# Patient Record
Sex: MALE | Race: WHITE | URBAN - METROPOLITAN AREA
[De-identification: names, ages, dates, MRNs, and addresses within clinical notes are randomized per-mention and may not be internally consistent; named-entity substitution may affect disease eponyms.]

---

## 2017-07-16 ENCOUNTER — INPATIENT (INPATIENT)
Facility: HOSPITAL | Age: 72
LOS: 0 days | Discharge: LEFT BEFORE DISCHARGE | DRG: 557 | End: 2017-07-17
Attending: INTERNAL MEDICINE | Admitting: INTERNAL MEDICINE
Payer: MEDICARE

## 2017-07-16 VITALS
DIASTOLIC BLOOD PRESSURE: 77 MMHG | SYSTOLIC BLOOD PRESSURE: 145 MMHG | WEIGHT: 119.93 LBS | TEMPERATURE: 99 F | OXYGEN SATURATION: 96 % | RESPIRATION RATE: 16 BRPM | HEART RATE: 68 BPM

## 2017-07-16 DIAGNOSIS — N18.6 END STAGE RENAL DISEASE: ICD-10-CM

## 2017-07-16 DIAGNOSIS — I10 ESSENTIAL (PRIMARY) HYPERTENSION: ICD-10-CM

## 2017-07-16 DIAGNOSIS — D64.9 ANEMIA, UNSPECIFIED: ICD-10-CM

## 2017-07-16 DIAGNOSIS — L03.90 CELLULITIS, UNSPECIFIED: ICD-10-CM

## 2017-07-16 DIAGNOSIS — K94.13 ENTEROSTOMY MALFUNCTION: ICD-10-CM

## 2017-07-16 DIAGNOSIS — N19 UNSPECIFIED KIDNEY FAILURE: ICD-10-CM

## 2017-07-16 DIAGNOSIS — M70.21 OLECRANON BURSITIS, RIGHT ELBOW: ICD-10-CM

## 2017-07-16 DIAGNOSIS — I25.10 ATHEROSCLEROTIC HEART DISEASE OF NATIVE CORONARY ARTERY WITHOUT ANGINA PECTORIS: ICD-10-CM

## 2017-07-16 LAB
ALBUMIN SERPL ELPH-MCNC: 3.1 G/DL — LOW (ref 3.3–5)
ALP SERPL-CCNC: 82 U/L — SIGNIFICANT CHANGE UP (ref 40–120)
ALT FLD-CCNC: 16 U/L — SIGNIFICANT CHANGE UP (ref 12–78)
AMYLASE P1 CFR SERPL: 99 U/L — SIGNIFICANT CHANGE UP (ref 25–115)
ANION GAP SERPL CALC-SCNC: 17 MMOL/L — SIGNIFICANT CHANGE UP (ref 5–17)
AST SERPL-CCNC: 15 U/L — SIGNIFICANT CHANGE UP (ref 15–37)
BASOPHILS # BLD AUTO: 0.1 K/UL — SIGNIFICANT CHANGE UP (ref 0–0.2)
BASOPHILS NFR BLD AUTO: 1.4 % — SIGNIFICANT CHANGE UP (ref 0–2)
BILIRUB SERPL-MCNC: 0.5 MG/DL — SIGNIFICANT CHANGE UP (ref 0.2–1.2)
BUN SERPL-MCNC: 85 MG/DL — HIGH (ref 7–23)
CALCIUM SERPL-MCNC: 7.7 MG/DL — LOW (ref 8.5–10.1)
CHLORIDE SERPL-SCNC: 113 MMOL/L — HIGH (ref 96–108)
CO2 SERPL-SCNC: 13 MMOL/L — LOW (ref 22–31)
CREAT SERPL-MCNC: 9.4 MG/DL — HIGH (ref 0.5–1.3)
CRP SERPL-MCNC: 0.5 MG/DL — HIGH (ref 0–0.4)
EOSINOPHIL # BLD AUTO: 1.4 K/UL — HIGH (ref 0–0.5)
EOSINOPHIL NFR BLD AUTO: 14.9 % — HIGH (ref 0–6)
ERYTHROCYTE [SEDIMENTATION RATE] IN BLOOD: 29 MM/HR — HIGH (ref 0–20)
GLUCOSE SERPL-MCNC: 80 MG/DL — SIGNIFICANT CHANGE UP (ref 70–99)
HCT VFR BLD CALC: 33.2 % — LOW (ref 39–50)
HGB BLD-MCNC: 10.8 G/DL — LOW (ref 13–17)
LIDOCAIN IGE QN: 356 U/L — SIGNIFICANT CHANGE UP (ref 73–393)
LYMPHOCYTES # BLD AUTO: 1 K/UL — SIGNIFICANT CHANGE UP (ref 1–3.3)
LYMPHOCYTES # BLD AUTO: 10.2 % — LOW (ref 13–44)
MCHC RBC-ENTMCNC: 32.6 GM/DL — SIGNIFICANT CHANGE UP (ref 32–36)
MCHC RBC-ENTMCNC: 33.4 PG — SIGNIFICANT CHANGE UP (ref 27–34)
MCV RBC AUTO: 102.4 FL — HIGH (ref 80–100)
MONOCYTES # BLD AUTO: 0.8 K/UL — SIGNIFICANT CHANGE UP (ref 0–0.9)
MONOCYTES NFR BLD AUTO: 8.5 % — SIGNIFICANT CHANGE UP (ref 1–9)
NEUTROPHILS # BLD AUTO: 6.3 K/UL — SIGNIFICANT CHANGE UP (ref 1.8–7.4)
NEUTROPHILS NFR BLD AUTO: 65 % — SIGNIFICANT CHANGE UP (ref 43–77)
NT-PROBNP SERPL-SCNC: 5419 PG/ML — HIGH (ref 0–125)
PLATELET # BLD AUTO: 207 K/UL — SIGNIFICANT CHANGE UP (ref 150–400)
POTASSIUM SERPL-MCNC: 4.5 MMOL/L — SIGNIFICANT CHANGE UP (ref 3.5–5.3)
POTASSIUM SERPL-SCNC: 4.5 MMOL/L — SIGNIFICANT CHANGE UP (ref 3.5–5.3)
PROT SERPL-MCNC: 6.9 G/DL — SIGNIFICANT CHANGE UP (ref 6–8.3)
RBC # BLD: 3.25 M/UL — LOW (ref 4.2–5.8)
RBC # FLD: 15.2 % — HIGH (ref 10.3–14.5)
SODIUM SERPL-SCNC: 143 MMOL/L — SIGNIFICANT CHANGE UP (ref 135–145)
WBC # BLD: 9.6 K/UL — SIGNIFICANT CHANGE UP (ref 3.8–10.5)
WBC # FLD AUTO: 9.6 K/UL — SIGNIFICANT CHANGE UP (ref 3.8–10.5)

## 2017-07-16 PROCEDURE — 93010 ELECTROCARDIOGRAM REPORT: CPT

## 2017-07-16 PROCEDURE — 71010: CPT | Mod: 26

## 2017-07-16 PROCEDURE — 99285 EMERGENCY DEPT VISIT HI MDM: CPT

## 2017-07-16 PROCEDURE — 73070 X-RAY EXAM OF ELBOW: CPT | Mod: 26,RT

## 2017-07-16 RX ORDER — PIPERACILLIN AND TAZOBACTAM 4; .5 G/20ML; G/20ML
3.38 INJECTION, POWDER, LYOPHILIZED, FOR SOLUTION INTRAVENOUS EVERY 12 HOURS
Qty: 0 | Refills: 0 | Status: DISCONTINUED | OUTPATIENT
Start: 2017-07-16 | End: 2017-07-17

## 2017-07-16 RX ORDER — ACETAMINOPHEN 500 MG
650 TABLET ORAL EVERY 6 HOURS
Qty: 0 | Refills: 0 | Status: DISCONTINUED | OUTPATIENT
Start: 2017-07-16 | End: 2017-07-17

## 2017-07-16 RX ORDER — TAMSULOSIN HYDROCHLORIDE 0.4 MG/1
0.4 CAPSULE ORAL AT BEDTIME
Qty: 0 | Refills: 0 | Status: DISCONTINUED | OUTPATIENT
Start: 2017-07-16 | End: 2017-07-17

## 2017-07-16 RX ORDER — HYDRALAZINE HCL 50 MG
50 TABLET ORAL THREE TIMES A DAY
Qty: 0 | Refills: 0 | Status: DISCONTINUED | OUTPATIENT
Start: 2017-07-16 | End: 2017-07-17

## 2017-07-16 RX ORDER — METOPROLOL TARTRATE 50 MG
12.5 TABLET ORAL DAILY
Qty: 0 | Refills: 0 | Status: DISCONTINUED | OUTPATIENT
Start: 2017-07-16 | End: 2017-07-17

## 2017-07-16 RX ORDER — ASPIRIN/CALCIUM CARB/MAGNESIUM 324 MG
81 TABLET ORAL DAILY
Qty: 0 | Refills: 0 | Status: DISCONTINUED | OUTPATIENT
Start: 2017-07-16 | End: 2017-07-17

## 2017-07-16 RX ORDER — PIPERACILLIN AND TAZOBACTAM 4; .5 G/20ML; G/20ML
3.38 INJECTION, POWDER, LYOPHILIZED, FOR SOLUTION INTRAVENOUS ONCE
Qty: 0 | Refills: 0 | Status: COMPLETED | OUTPATIENT
Start: 2017-07-16 | End: 2017-07-16

## 2017-07-16 RX ORDER — SODIUM CHLORIDE 9 MG/ML
3 INJECTION INTRAMUSCULAR; INTRAVENOUS; SUBCUTANEOUS ONCE
Qty: 0 | Refills: 0 | Status: COMPLETED | OUTPATIENT
Start: 2017-07-16 | End: 2017-07-16

## 2017-07-16 RX ORDER — HEPARIN SODIUM 5000 [USP'U]/ML
5000 INJECTION INTRAVENOUS; SUBCUTANEOUS EVERY 12 HOURS
Qty: 0 | Refills: 0 | Status: DISCONTINUED | OUTPATIENT
Start: 2017-07-16 | End: 2017-07-17

## 2017-07-16 RX ORDER — ERGOCALCIFEROL 1.25 MG/1
50000 CAPSULE ORAL
Qty: 0 | Refills: 0 | Status: DISCONTINUED | OUTPATIENT
Start: 2017-07-16 | End: 2017-07-17

## 2017-07-16 RX ORDER — SODIUM BICARBONATE 1 MEQ/ML
650 SYRINGE (ML) INTRAVENOUS
Qty: 0 | Refills: 0 | Status: DISCONTINUED | OUTPATIENT
Start: 2017-07-16 | End: 2017-07-17

## 2017-07-16 RX ADMIN — PIPERACILLIN AND TAZOBACTAM 200 GRAM(S): 4; .5 INJECTION, POWDER, LYOPHILIZED, FOR SOLUTION INTRAVENOUS at 16:00

## 2017-07-16 RX ADMIN — Medication 12.5 MILLIGRAM(S): at 17:38

## 2017-07-16 RX ADMIN — Medication 50 MILLIGRAM(S): at 17:39

## 2017-07-16 RX ADMIN — SODIUM CHLORIDE 3 MILLILITER(S): 9 INJECTION INTRAMUSCULAR; INTRAVENOUS; SUBCUTANEOUS at 11:29

## 2017-07-16 RX ADMIN — TAMSULOSIN HYDROCHLORIDE 0.4 MILLIGRAM(S): 0.4 CAPSULE ORAL at 21:55

## 2017-07-16 RX ADMIN — HEPARIN SODIUM 5000 UNIT(S): 5000 INJECTION INTRAVENOUS; SUBCUTANEOUS at 17:37

## 2017-07-16 RX ADMIN — PIPERACILLIN AND TAZOBACTAM 25 GRAM(S): 4; .5 INJECTION, POWDER, LYOPHILIZED, FOR SOLUTION INTRAVENOUS at 17:37

## 2017-07-16 RX ADMIN — Medication 50 MILLIGRAM(S): at 21:55

## 2017-07-16 NOTE — ED PROVIDER NOTE - OBJECTIVE STATEMENT
Pt is a 72 yo male reporting right elbow pain.  Pt reports that his right elbow has been swollen for the last 3 weeks.  Denies trauma to the elbow denies numbness in his ext.  He saw his PMD for this who initially diagnosed him with bursitis.  Pt reports that he has now developed overlying erythema with mild pain.  Denies previous episodes of this.  Pt also reports that he has a history of ulcerative colitis and has an ileostomy secondary to this.  He also suffers from ESRD and receives dialysis Tuesday and Saturday.  He missed his last dialysis session because his ileostomy site was leaking and he did not feel like leaving his house.  He notified his nephrologist today who told him to come to the ED for dialysis.  Denies fever, chills, N/V, CP, abd pain. Does report some SOB with non-productive cough  Pt is right hand dominant

## 2017-07-16 NOTE — CONSULT NOTE ADULT - ASSESSMENT
72 yo male presents to ER with complains of R elbow pain and swelling x 2 weeks ,denies trauma ,injury,xray demonstrated  bursitis  .Patient  missed his HD session yesterday

## 2017-07-16 NOTE — H&P ADULT - PROBLEM SELECTOR PLAN 3
continue current managmnet and medications ,Seen by Dr. Odonnell ( recommneded  asa ,plavix inititation ( patient was not on antipaltelets before likely due to UC ,but he dosent remember hx of gib )

## 2017-07-16 NOTE — ED ADULT NURSE NOTE - OBJECTIVE STATEMENT
Pt sent to ED to be admitted for dialysis. Pt reports he missed his dialysis appt. yesterday because his ileostomy was leaking, last dialyzed on 7/18, regular dialysis days are Tuesday & Saturday. Pt is C/O right elbow swelling, denies additional complaints

## 2017-07-16 NOTE — CONSULT NOTE ADULT - PROBLEM SELECTOR RECOMMENDATION 3
ANEMIA PLAN:  Anemia of chronic disease:  Well controlled by epo  H and H subtherapeutic .  We will continue epo  aiming for a HCT of 32-36 %.   We will monitor Iron stores, B12 and RBC folate .

## 2017-07-16 NOTE — CONSULT NOTE ADULT - SUBJECTIVE AND OBJECTIVE BOX
Patient is a 71y Male whom presented to the hospital with     PAST MEDICAL & SURGICAL HISTORY:  Anemia  Hypertension  Renal failure  No significant past surgical history      MEDICATIONS  (STANDING):  heparin  Injectable 5000 Unit(s) SubCutaneous every 12 hours  piperacillin/tazobactam IVPB. 3.375 Gram(s) IV Intermittent every 12 hours  aspirin enteric coated 81 milliGRAM(s) Oral daily  sodium bicarbonate 650 milliGRAM(s) Oral two times a day  tamsulosin 0.4 milliGRAM(s) Oral at bedtime  metoprolol 12.5 milliGRAM(s) Oral daily  artificial  tears Solution 1 Drop(s) Both EYES daily  hydrALAZINE 50 milliGRAM(s) Oral three times a day  calcium carbonate 1250 mG + Vitamin D (OsCal 500 + D) 1 Tablet(s) Oral every week  ergocalciferol 96044 Unit(s) Oral <User Schedule>      Allergies    Codeine Phosphate (Nausea)  morphine (Nausea)  prednisoLONE (Nausea)    Intolerances        SOCIAL HISTORY:  Denies ETOh,Smoking,     FAMILY HISTORY:  No pertinent family history in first degree relatives      REVIEW OF SYSTEMS:    CONSTITUTIONAL: No weakness, fevers or chills  EYES/ENT: No visual changes;  No vertigo or throat pain   NECK: No pain or stiffness  RESPIRATORY: No cough, wheezing, hemoptysis; No shortness of breath  CARDIOVASCULAR: No chest pain or palpitations  GASTROINTESTINAL: No abdominal or epigastric pain. No nausea, vomiting, or hematemesis; No diarrhea or constipation. No melena or hematochezia.  GENITOURINARY: No dysuria, frequency or hematuria  NEUROLOGICAL: No numbness or weakness  SKIN: No itching, burning, rashes, or lesions   All other review of systems is negative unless indicated above.  RUE -R elbow swelling and erythema   VITAL:  T(C): , Max: 37 (07-16-17 @ 10:20)  T(F): , Max: 98.6 (07-16-17 @ 10:20)  HR: 66 (07-16-17 @ 16:00)  BP: 131/61 (07-16-17 @ 16:00)  BP(mean): --  RR: 16 (07-16-17 @ 16:00)  SpO2: 98% (07-16-17 @ 16:00)  Wt(kg): --    I and O's:      Weight (kg): 54.4 (07-16 @ 10:20)    PHYSICAL EXAM:    Constitutional: NAD  Neck: No LAD, No JVD  Respiratory: CTAB  Cardiovascular: S1 and S2  Gastrointestinal: BS+, soft, NT/ND  Extremities: No peripheral edema  Neurological: A/O x 3, no focal deficits  Psychiatric: Normal mood, normal affect  : No Bell  Skin: No rashes  RUE -R elbow swelling and erythema   LABS:                        10.8   9.6   )-----------( 207      ( 16 Jul 2017 11:21 )             33.2     07-16    143  |  113<H>  |  85<H>  ----------------------------<  80  4.5   |  13<L>  |  9.40<H>    Ca    7.7<L>      16 Jul 2017 11:21    TPro  6.9  /  Alb  3.1<L>  /  TBili  0.5  /  DBili  x   /  AST  15  /  ALT  16  /  AlkPhos  82  07-16      Urine Studies:          RADIOLOGY & ADDITIONAL STUDIES:

## 2017-07-16 NOTE — CONSULT NOTE ADULT - PROBLEM SELECTOR RECOMMENDATION 9
case d/w rn  will need ostomy bag applied to ostomy  diet as tolerated  will follow
Excess fluids and waste products will be removed from your blood; your electrolytes will be balanced; your blood pressure will be controlled.
ON ZOSYN

## 2017-07-16 NOTE — H&P ADULT - REASON FOR ADMISSION
R elbow pain and swelling .Also patient missed his HD yesterday and requests to see his nephrologist

## 2017-07-16 NOTE — CONSULT NOTE ADULT - SUBJECTIVE AND OBJECTIVE BOX
CARDIOLOGY CONSULT NOTE    Patient is a 71y Male with a known history of :    HPI:      REVIEW OF SYSTEMS:    CONSTITUTIONAL: No fever, weight loss, or fatigue  EYES: No eye pain, visual disturbances, or discharge  ENMT:  No difficulty hearing, tinnitus, vertigo; No sinus or throat pain  NECK: No pain or stiffness  BREASTS: No pain, masses, or nipple discharge  RESPIRATORY: No cough, wheezing, chills or hemoptysis; No shortness of breath  CARDIOVASCULAR: No chest pain, palpitations, dizziness, or leg swelling  GASTROINTESTINAL: No abdominal or epigastric pain. No nausea, vomiting, or hematemesis; No diarrhea or constipation. No melena or hematochezia.  GENITOURINARY: No dysuria, frequency, hematuria, or incontinence  NEUROLOGICAL: No headaches, memory loss, loss of strength, numbness, or tremors  SKIN: No itching, burning, rashes, or lesions   LYMPH NODES: No enlarged glands  ENDOCRINE: No heat or cold intolerance; No hair loss  MUSCULOSKELETAL: No joint pain or swelling; No muscle, back, or extremity pain  PSYCHIATRIC: No depression, anxiety, mood swings, or difficulty sleeping  HEME/LYMPH: No easy bruising, or bleeding gums  ALLERGY AND IMMUNOLOGIC: No hives or eczema    MEDICATIONS  (STANDING):  heparin  Injectable 5000 Unit(s) SubCutaneous every 12 hours  piperacillin/tazobactam IVPB. 3.375 Gram(s) IV Intermittent once  piperacillin/tazobactam IVPB. 3.375 Gram(s) IV Intermittent every 12 hours  aspirin enteric coated 81 milliGRAM(s) Oral daily  sodium bicarbonate 650 milliGRAM(s) Oral two times a day  tamsulosin 0.4 milliGRAM(s) Oral at bedtime  metoprolol 12.5 milliGRAM(s) Oral daily  artificial  tears Solution 1 Drop(s) Both EYES daily  hydrALAZINE 50 milliGRAM(s) Oral three times a day  calcium carbonate 1250 mG + Vitamin D (OsCal 500 + D) 1 Tablet(s) Oral every week  ergocalciferol 88374 Unit(s) Oral every week    MEDICATIONS  (PRN):      ALLERGIES: Codeine Phosphate (Nausea)  morphine (Nausea)  prednisoLONE (Nausea)      Social History:     FAMILY HISTORY:  No pertinent family history in first degree relatives      PAST MEDICAL & SURGICAL HISTORY:  Anemia  Hypertension  Renal failure  No significant past surgical history        PHYSICAL EXAMINATION:  -----------------------------  T(C): 37 (07-16-17 @ 10:20), Max: 37 (07-16-17 @ 10:20)  HR: 68 (07-16-17 @ 10:20) (68 - 68)  BP: 145/77 (07-16-17 @ 10:20) (145/77 - 145/77)  RR: 16 (07-16-17 @ 10:20) (16 - 16)  SpO2: 96% (07-16-17 @ 10:20) (96% - 96%)  Wt(kg): --      Weight (kg): 54.4 (07-16 @ 10:20)    Constitutional: well developed, normal appearance, well groomed, well nourished, no deformities and no acute distress.   Eyes: the conjunctiva exhibited no abnormalities and the eyelids demonstrated no xanthelasmas.   HEENT: normal oral mucosa, no oral pallor and no oral cyanosis.   Neck: normal jugular venous A waves present, normal jugular venous V waves present and no jugular venous herron A waves.   Pulmonary: no respiratory distress, normal respiratory rhythm and effort, no accessory muscle use and lungs were clear to auscultation bilaterally.   Cardiovascular: heart rate and rhythm were normal, normal S1 and S2 and no murmur, gallop, rub, heave or thrill are present.   Abdomen: soft, non-tender, no hepato-splenomegaly and no abdominal mass palpated.   Musculoskeletal: the gait could not be assessed..   Extremities: no clubbing of the fingernails, no localized cyanosis, no petechial hemorrhages and no ischemic changes.   Skin: normal skin color and pigmentation, no rash, no venous stasis, no skin lesions, no skin ulcer and no xanthoma was observed.   Psychiatric: oriented to person, place, and time, the affect was normal, the mood was normal and not feeling anxious.     LABS:   --------                             10.8   9.6   )-----------( 207      ( 16 Jul 2017 11:21 )             33.2                 RADIOLOGY:  -----------------        ECG:     ECHO

## 2017-07-16 NOTE — H&P ADULT - HISTORY OF PRESENT ILLNESS
70 yo male presents to ER with complains of R elbow pain and swelling x 2 weeks ,denies trauma ,injury,xray demonstrated olecaranon bursitis  .Patient  missed his HD session yesterday and requests to see his nephrologist He denies cp,sob ,but admits BALLARD ,also complains of unstaedy gait and progressive weakness Patient reports leaking ileostomy bag

## 2017-07-16 NOTE — H&P ADULT - ASSESSMENT
72 yo male presents to ER with complains of R elbow pain and swelling x 2 weeks ,denies trauma ,injury,xray demonstrated olecaranon bursitis  .Patient  missed his HD session yesterday and requests to see his nephrologist He denies cp,sob ,but admits BALLARD ,also complains of unstaedy gait and progressive weakness

## 2017-07-16 NOTE — CONSULT NOTE ADULT - SUBJECTIVE AND OBJECTIVE BOX
Chief Complaint:  Patient is a 71y old  Male who presents with a chief complaint of     HPI: 71M who presents with leaking ileostomy  patient has a bella ostomy done 40 years ago for UC  states has always been leaking    Allergies:  Codeine Phosphate (Nausea)  morphine (Nausea)  prednisoLONE (Nausea)      Medications:  heparin  Injectable 5000 Unit(s) SubCutaneous every 12 hours  piperacillin/tazobactam IVPB. 3.375 Gram(s) IV Intermittent once  piperacillin/tazobactam IVPB. 3.375 Gram(s) IV Intermittent every 12 hours  aspirin enteric coated 81 milliGRAM(s) Oral daily  sodium bicarbonate 650 milliGRAM(s) Oral two times a day  tamsulosin 0.4 milliGRAM(s) Oral at bedtime  metoprolol 12.5 milliGRAM(s) Oral daily  artificial  tears Solution 1 Drop(s) Both EYES daily  hydrALAZINE 50 milliGRAM(s) Oral three times a day  calcium carbonate 1250 mG + Vitamin D (OsCal 500 + D) 1 Tablet(s) Oral every week  ergocalciferol 76390 Unit(s) Oral <User Schedule>  acetaminophen   Tablet 650 milliGRAM(s) Oral every 6 hours PRN  acetaminophen   Tablet. 650 milliGRAM(s) Oral every 6 hours PRN      PMHX/PSHX:  Anemia  Hypertension  Renal failure  No significant past surgical history      Family history:  No pertinent family history in first degree relatives      Social History:     ROS:     General:  No wt loss, fevers, chills, night sweats, fatigue,   Eyes:  Good vision, no reported pain  ENT:  No sore throat, pain, runny nose, dysphagia  CV:  No pain, palpitations, hypo/hypertension  Resp:  No dyspnea, cough, tachypnea, wheezing  GI:  No pain, No nausea, No vomiting, No diarrhea, No constipation, No weight loss, No fever, No pruritis, No rectal bleeding, No tarry stools, No dysphagia,  :  No pain, bleeding, incontinence, nocturia  Muscle:  No pain, weakness  Neuro:  No weakness, tingling, memory problems  Psych:  No fatigue, insomnia, mood problems, depression  Endocrine:  No polyuria, polydipsia, cold/heat intolerance  Heme:  No petechiae, ecchymosis, easy bruisability  Skin:  No rash, tattoos, scars, edema      PHYSICAL EXAM:   Vital Signs:  Vital Signs Last 24 Hrs  T(C): 37 (16 Jul 2017 10:20), Max: 37 (16 Jul 2017 10:20)  T(F): 98.6 (16 Jul 2017 10:20), Max: 98.6 (16 Jul 2017 10:20)  HR: 68 (16 Jul 2017 10:20) (68 - 68)  BP: 145/77 (16 Jul 2017 10:20) (145/77 - 145/77)  BP(mean): --  RR: 16 (16 Jul 2017 10:20) (16 - 16)  SpO2: 96% (16 Jul 2017 10:20) (96% - 96%)  Daily     Daily     GENERAL:  Appears stated age, well-groomed, well-nourished, no distress  HEENT:  NC/AT,  conjunctivae clear and pink, no thyromegaly, nodules, adenopathy, no JVD, sclera -anicteric  CHEST:  Full & symmetric excursion, no increased effort, breath sounds clear  HEART:  Regular rhythm, S1, S2, no murmur/rub/S3/S4, no abdominal bruit, no edema  ABDOMEN:  Soft, non-tender, non-distended, normoactive bowel sounds,  no masses ,no hepato-splenomegaly, no signs of chronic liver disease  EXTEREMITIES:  no cyanosis,clubbing or edema  SKIN:  No rash/erythema/ecchymoses/petechiae/wounds/abscess/warm/dry  NEURO:  Alert, oriented, no asterixis, no tremor, no encephalopathy    LABS:                        10.8   9.6   )-----------( 207      ( 16 Jul 2017 11:21 )             33.2     07-16    143  |  113<H>  |  85<H>  ----------------------------<  80  4.5   |  13<L>  |  9.40<H>    Ca    7.7<L>      16 Jul 2017 11:21    TPro  6.9  /  Alb  3.1<L>  /  TBili  0.5  /  DBili  x   /  AST  15  /  ALT  16  /  AlkPhos  82  07-16    LIVER FUNCTIONS - ( 16 Jul 2017 11:21 )  Alb: 3.1 g/dL / Pro: 6.9 g/dL / ALK PHOS: 82 U/L / ALT: 16 U/L / AST: 15 U/L / GGT: x               Amylase Serum99      Lipase [id number]       Ammonia--      Imaging:

## 2017-07-16 NOTE — ED PROVIDER NOTE - MUSCULOSKELETAL, MLM
superficial joint effusion/ bursitis noted to right elbow, FROM sensation intact overlying erythema with increased warm +radial pulse no streaking lymphangiitis seen

## 2017-07-16 NOTE — CONSULT NOTE ADULT - PROBLEM SELECTOR RECOMMENDATION 4
,obtain ECHO to evaluate LVEF,cardiology consult,continue current managmnet,O2 supply,anticoagulation plan as per cardiology consult

## 2017-07-17 VITALS — WEIGHT: 133.82 LBS

## 2017-07-17 LAB
ANION GAP SERPL CALC-SCNC: 14 MMOL/L — SIGNIFICANT CHANGE UP (ref 5–17)
BUN SERPL-MCNC: 89 MG/DL — HIGH (ref 7–23)
CALCIUM SERPL-MCNC: 7.4 MG/DL — LOW (ref 8.5–10.1)
CHLORIDE SERPL-SCNC: 112 MMOL/L — HIGH (ref 96–108)
CO2 SERPL-SCNC: 16 MMOL/L — LOW (ref 22–31)
CREAT SERPL-MCNC: 9.3 MG/DL — HIGH (ref 0.5–1.3)
GLUCOSE SERPL-MCNC: 107 MG/DL — HIGH (ref 70–99)
HCT VFR BLD CALC: 29.5 % — LOW (ref 39–50)
HGB BLD-MCNC: 9.8 G/DL — LOW (ref 13–17)
INR BLD: 0.94 RATIO — SIGNIFICANT CHANGE UP (ref 0.88–1.16)
MCHC RBC-ENTMCNC: 33.3 GM/DL — SIGNIFICANT CHANGE UP (ref 32–36)
MCHC RBC-ENTMCNC: 33.7 PG — SIGNIFICANT CHANGE UP (ref 27–34)
MCV RBC AUTO: 101.4 FL — HIGH (ref 80–100)
PLATELET # BLD AUTO: 185 K/UL — SIGNIFICANT CHANGE UP (ref 150–400)
POTASSIUM SERPL-MCNC: 3.7 MMOL/L — SIGNIFICANT CHANGE UP (ref 3.5–5.3)
POTASSIUM SERPL-SCNC: 3.7 MMOL/L — SIGNIFICANT CHANGE UP (ref 3.5–5.3)
PREALB SERPL-MCNC: 27.1 MG/DL — SIGNIFICANT CHANGE UP (ref 20–40)
PROTHROM AB SERPL-ACNC: 10.2 SEC — SIGNIFICANT CHANGE UP (ref 9.8–12.7)
RBC # BLD: 2.91 M/UL — LOW (ref 4.2–5.8)
RBC # FLD: 15.4 % — HIGH (ref 10.3–14.5)
SODIUM SERPL-SCNC: 142 MMOL/L — SIGNIFICANT CHANGE UP (ref 135–145)
WBC # BLD: 8.7 K/UL — SIGNIFICANT CHANGE UP (ref 3.8–10.5)
WBC # FLD AUTO: 8.7 K/UL — SIGNIFICANT CHANGE UP (ref 3.8–10.5)

## 2017-07-17 PROCEDURE — 83690 ASSAY OF LIPASE: CPT

## 2017-07-17 PROCEDURE — 80053 COMPREHEN METABOLIC PANEL: CPT

## 2017-07-17 PROCEDURE — 85027 COMPLETE CBC AUTOMATED: CPT

## 2017-07-17 PROCEDURE — 83880 ASSAY OF NATRIURETIC PEPTIDE: CPT

## 2017-07-17 PROCEDURE — 86140 C-REACTIVE PROTEIN: CPT

## 2017-07-17 PROCEDURE — 99285 EMERGENCY DEPT VISIT HI MDM: CPT | Mod: 25

## 2017-07-17 PROCEDURE — 71045 X-RAY EXAM CHEST 1 VIEW: CPT

## 2017-07-17 PROCEDURE — 97161 PT EVAL LOW COMPLEX 20 MIN: CPT

## 2017-07-17 PROCEDURE — 96365 THER/PROPH/DIAG IV INF INIT: CPT

## 2017-07-17 PROCEDURE — 80048 BASIC METABOLIC PNL TOTAL CA: CPT

## 2017-07-17 PROCEDURE — 96372 THER/PROPH/DIAG INJ SC/IM: CPT | Mod: XU

## 2017-07-17 PROCEDURE — 85610 PROTHROMBIN TIME: CPT

## 2017-07-17 PROCEDURE — 93005 ELECTROCARDIOGRAM TRACING: CPT

## 2017-07-17 PROCEDURE — 73070 X-RAY EXAM OF ELBOW: CPT

## 2017-07-17 PROCEDURE — 82150 ASSAY OF AMYLASE: CPT

## 2017-07-17 PROCEDURE — 84134 ASSAY OF PREALBUMIN: CPT

## 2017-07-17 PROCEDURE — 85652 RBC SED RATE AUTOMATED: CPT

## 2017-07-17 RX ADMIN — PIPERACILLIN AND TAZOBACTAM 25 GRAM(S): 4; .5 INJECTION, POWDER, LYOPHILIZED, FOR SOLUTION INTRAVENOUS at 05:10

## 2017-07-17 RX ADMIN — HEPARIN SODIUM 5000 UNIT(S): 5000 INJECTION INTRAVENOUS; SUBCUTANEOUS at 17:13

## 2017-07-17 RX ADMIN — Medication 81 MILLIGRAM(S): at 13:26

## 2017-07-17 RX ADMIN — Medication 50 MILLIGRAM(S): at 05:11

## 2017-07-17 RX ADMIN — Medication 50 MILLIGRAM(S): at 14:57

## 2017-07-17 RX ADMIN — Medication 650 MILLIGRAM(S): at 05:11

## 2017-07-17 RX ADMIN — Medication 650 MILLIGRAM(S): at 17:11

## 2017-07-17 RX ADMIN — Medication 12.5 MILLIGRAM(S): at 05:12

## 2017-07-17 RX ADMIN — HEPARIN SODIUM 5000 UNIT(S): 5000 INJECTION INTRAVENOUS; SUBCUTANEOUS at 05:11

## 2017-07-17 NOTE — PROGRESS NOTE ADULT - ASSESSMENT
70 yo male presents to ER with complains of R elbow pain and swelling x 2 weeks ,denies trauma ,injury,xray demonstrated olecaranon bursitis  .Patient  missed his HD session yesterday and requests to see his nephrologist He denies cp,sob ,but admits BALLARD ,also complains of unstaedy gait and progressive weakness
72 yo male presents to ER with complains of R elbow pain and swelling x 2 weeks ,denies trauma ,injury,xray demonstrated  bursitis  .Patient  missed his HD session yesterday
72 yo male with a 40 year history of ileostomy
pt with esrd - hd - cellulitis rt elbow

## 2017-07-17 NOTE — PROGRESS NOTE ADULT - PROBLEM SELECTOR PROBLEM 2
Ileostomy dysfunction
ESRD (end stage renal disease) on dialysis
ESRD (end stage renal disease) on dialysis

## 2017-07-17 NOTE — CONSULT NOTE ADULT - SUBJECTIVE AND OBJECTIVE BOX
71M admitted with possible cellulitis and right elbow olecranon bursitis.  Patient seen at bedside to evaluate elbow.  Reports elbow has been swollen in the back for the past 2-3 weeks.  Denies any recent fever/chills/recent infection, denies any trauma to elbow or any insect bites/pimples/lesions of the skin.    AVSS  Gen: NAD, AAOx3    Right Upper Extremity:  Skin intact, palpable fluid collection posterior to olecranon, slight warmth, no erythema  +AIN/PIN/M/R/U/Msc/Ax  Able to actively flex and extend elbow without pain  SILT C5-T1  +Radial Pulse  Compartments soft  No calf TTP B/L    Secondary Survey: No TTP over bony prominences, SILT, palpable pulses, full/painless range of motion, compartments soft 71M admitted with possible cellulitis and right elbow olecranon bursitis.  Patient seen at bedside to evaluate elbow.  Reports elbow has been swollen in the back for the past 2-3 weeks.  Denies any recent fever/chills/recent infection, denies any trauma to elbow or any insect bites/pimples/lesions of the skin.    AVSS  Gen: NAD, AAOx3    Right Upper Extremity:  Skin intact, palpable fluid collection posterior to olecranon, slight warmth, no erythema, no TTP at wrist/elbow  +AIN/PIN/M/R/U/Msc/Ax  Able to actively flex and extend elbow without pain through full arc of ROM, no elbow laxity  SILT C5-T1  +Radial Pulse  Compartments soft  No calf TTP B/L    Secondary Survey: No TTP over bony prominences, SILT, palpable pulses, full/painless range of motion, compartments soft

## 2017-07-17 NOTE — PROGRESS NOTE ADULT - PROBLEM SELECTOR PLAN 4
ileostomy care ,GI cons ,continue ppi
s/p coronary stent
,obtain ECHO to evaluate LVEF,cardiology consult,continue current managmnet,O2 supply,anticoagulation plan as per cardiology consult

## 2017-07-17 NOTE — CONSULT NOTE ADULT - ASSESSMENT
A/P: 71M with R olecranon bursitis  Based on clinical examination, labs, imaging and physical examination, unlikely infected elbow joint.  No antibiotics needed for R olecranon bursitis.  Analgesia  DVT ppx, encourage ambulation  WBAT RUE  PT/OT as needed  FU with Dr. Felix as outpatient in 1-2 weeks from discharge, call office for appointment  No acute orthopedic intervention needed at this time  Will discuss with attending and advise if plan changes A/P: 71M with R olecranon bursitis  Based on clinical examination, labs, imaging and physical examination, unlikely infected elbow joint.  No antibiotics needed for R olecranon bursitis.  Analgesia  DVT ppx, encourage ambulation  WBAT RUE  PT/OT as needed  Recommend warm compresses and compressive therapy daily (was wrapped in ACE wrap at bedside)  FU with Dr. Felix as outpatient in 1-2 weeks from discharge, call office for appointment  No acute orthopedic intervention needed at this time  Will discuss with attending and advise if plan changes

## 2017-07-17 NOTE — PROVIDER CONTACT NOTE (OTHER) - BACKGROUND
pt wanted to be d/c after dialysis, Dr. Cabello was called earlier and made aware on her rounds in am of his wishes

## 2017-07-17 NOTE — PROGRESS NOTE ADULT - PROBLEM SELECTOR PLAN 1
Admitted for septic workup and evaluation,send blood and urine cx,serial lactate levels,monitor vitals closley,ivfs hydration,monitor urine output and renal profile,iv abx initiated
no leakage noted with ostomy bag  ostomy care explained to patient  follow up as outpatient
on zosyn
Excess fluids and waste products will be removed from your blood; your electrolytes will be balanced; your blood pressure will be controlled.

## 2017-07-17 NOTE — PROGRESS NOTE ADULT - SUBJECTIVE AND OBJECTIVE BOX
Chief Complaint:  Patient is a 71y old  Male who presents with a chief complaint of leakage from stoma    HPI: 71M who presents with leaking ileostomy  patient has a bella ostomy done 40 years ago for UC  states has always been leaking    MEDICATIONS  (STANDING):  heparin  Injectable 5000 Unit(s) SubCutaneous every 12 hours  piperacillin/tazobactam IVPB. 3.375 Gram(s) IV Intermittent every 12 hours  aspirin enteric coated 81 milliGRAM(s) Oral daily  sodium bicarbonate 650 milliGRAM(s) Oral two times a day  tamsulosin 0.4 milliGRAM(s) Oral at bedtime  metoprolol 12.5 milliGRAM(s) Oral daily  artificial  tears Solution 1 Drop(s) Both EYES daily  hydrALAZINE 50 milliGRAM(s) Oral three times a day  calcium carbonate 1250 mG + Vitamin D (OsCal 500 + D) 1 Tablet(s) Oral every week  ergocalciferol 18170 Unit(s) Oral <User Schedule>    MEDICATIONS  (PRN):  acetaminophen   Tablet 650 milliGRAM(s) Oral every 6 hours PRN For Temp greater than 38 C (100.4 F)  acetaminophen   Tablet. 650 milliGRAM(s) Oral every 6 hours PRN Mild Pain (1 - 3)      Allergies    Codeine Phosphate (Nausea)  morphine (Nausea)  prednisoLONE (Nausea)    Intolerances          General:  No wt loss, fevers, chills, night sweats, fatigue,   Eyes:  Good vision, no reported pain  ENT:  No sore throat, pain, runny nose, dysphagia  CV:  No pain, palpitations, hypo/hypertension  Resp:  No dyspnea, cough, tachypnea, wheezing  GI:  No pain, No nausea, No vomiting, No diarrhea, No constipation, No weight loss, No fever, No pruritis, No rectal bleeding, No tarry stools, No dysphagia,  :  No pain, bleeding, incontinence, nocturia  Muscle:  No pain, weakness  Neuro:  No weakness, tingling, memory problems  Psych:  No fatigue, insomnia, mood problems, depression  Endocrine:  No polyuria, polydipsia, cold/heat intolerance  Heme:  No petechiae, ecchymosis, easy bruisability  Skin:  No rash, tattoos, scars, edema      PHYSICAL EXAM:   Vital Signs:  Vital Signs Last 24 Hrs  T(C): 36.9 (2017 13:54), Max: 37.1 (2017 04:48)  T(F): 98.5 (2017 13:54), Max: 98.7 (2017 04:48)  HR: 71 (2017 13:54) (55 - 71)  BP: 139/67 (2017 13:54) (122/62 - 143/70)  BP(mean): --  RR: 16 (2017 13:54) (16 - 18)  SpO2: 100% (2017 13:54) (98% - 977%)  Daily     Daily Weight in k.7 (2017 15:13)I&O's Summary    2017 07:01  -  2017 07:00  --------------------------------------------------------  IN: 360 mL / OUT: 0 mL / NET: 360 mL    2017 07:01  -  2017 18:28  --------------------------------------------------------  IN: 0 mL / OUT: 0 mL / NET: 0 mL        GENERAL:  Appears stated age, well-groomed, well-nourished, no distress  HEENT:  NC/AT,  conjunctivae clear and pink, no thyromegaly, nodules, adenopathy, no JVD, sclera -anicteric  CHEST:  Full & symmetric excursion, no increased effort, breath sounds clear  HEART:  Regular rhythm, S1, S2, no murmur/rub/S3/S4, no abdominal bruit, no edema  ABDOMEN:  Soft, non-tender, non-distended, normoactive bowel sounds,  no masses ,no hepato-splenomegaly, no signs of chronic liver disease, + stoma  EXTEREMITIES:  no cyanosis,clubbing or edema  SKIN:  No rash/erythema/ecchymoses/petechiae/wounds/abscess/warm/dry  NEURO:  Alert, oriented, no asterixis, no tremor, no encephalopathy      LABS:                        9.8    8.7   )-----------( 185      ( 2017 06:37 )             29.5     -    142  |  112<H>  |  89<H>  ----------------------------<  107<H>  3.7   |  16<L>  |  9.30<H>    Ca    7.4<L>      2017 06:37    TPro  6.9  /  Alb  3.1<L>  /  TBili  0.5  /  DBili  x   /  AST  15  /  ALT  16  /  AlkPhos  82  -    PT/INR - ( 2017 06:37 )   PT: 10.2 sec;   INR: 0.94 ratio             amylaseAmylase, Serum Total: 99 U/L ( @ 11:21)     lipaseLipase, Serum: 356 U/L ( @ 11:21)    RADIOLOGY & ADDITIONAL TESTS:
Patient is a 71y Male with a known history of :  Anemia (D64.9)  Olecranon bursitis of right elbow (M70.21)  ESRD (end stage renal disease) on dialysis (N18.6)  Ileostomy dysfunction (K94.13)  CAD (coronary artery disease) (I25.10)  Hypertension (I10)  Renal failure (N19)  Cellulitis (L03.90)    HPI:  72 yo male presents to ER with complains of R elbow pain and swelling x 2 weeks ,denies trauma ,injury,xray demonstrated olecaranon bursitis  .Patient  missed his HD session yesterday and requests to see his nephrologist He denies cp,sob ,but admits BALLARD ,also complains of unstaedy gait and progressive weakness Patient reports leaking ileostomy bag (16 Jul 2017 11:45)      REVIEW OF SYSTEMS:    CONSTITUTIONAL: No fever, weight loss, or fatigue  EYES: No eye pain, visual disturbances, or discharge  ENMT:  No difficulty hearing, tinnitus, vertigo; No sinus or throat pain  NECK: No pain or stiffness  BREASTS: No pain, masses, or nipple discharge  RESPIRATORY: No cough, wheezing, chills or hemoptysis; No shortness of breath  CARDIOVASCULAR: No chest pain, palpitations, dizziness, or leg swelling  GASTROINTESTINAL: No abdominal or epigastric pain. No nausea, vomiting, or hematemesis; No diarrhea or constipation. No melena or hematochezia.  GENITOURINARY: No dysuria, frequency, hematuria, or incontinence  NEUROLOGICAL: No headaches, memory loss, loss of strength, numbness, or tremors  SKIN: No itching, burning, rashes, or lesions   LYMPH NODES: No enlarged glands  ENDOCRINE: No heat or cold intolerance; No hair loss  MUSCULOSKELETAL: No joint pain or swelling; No muscle, back, or extremity pain  PSYCHIATRIC: No depression, anxiety, mood swings, or difficulty sleeping  HEME/LYMPH: No easy bruising, or bleeding gums  ALLERGY AND IMMUNOLOGIC: No hives or eczema    MEDICATIONS  (STANDING):  heparin  Injectable 5000 Unit(s) SubCutaneous every 12 hours  piperacillin/tazobactam IVPB. 3.375 Gram(s) IV Intermittent every 12 hours  aspirin enteric coated 81 milliGRAM(s) Oral daily  sodium bicarbonate 650 milliGRAM(s) Oral two times a day  tamsulosin 0.4 milliGRAM(s) Oral at bedtime  metoprolol 12.5 milliGRAM(s) Oral daily  artificial  tears Solution 1 Drop(s) Both EYES daily  hydrALAZINE 50 milliGRAM(s) Oral three times a day  calcium carbonate 1250 mG + Vitamin D (OsCal 500 + D) 1 Tablet(s) Oral every week  ergocalciferol 01854 Unit(s) Oral <User Schedule>    MEDICATIONS  (PRN):  acetaminophen   Tablet 650 milliGRAM(s) Oral every 6 hours PRN For Temp greater than 38 C (100.4 F)  acetaminophen   Tablet. 650 milliGRAM(s) Oral every 6 hours PRN Mild Pain (1 - 3)      ALLERGIES: Codeine Phosphate (Nausea)  morphine (Nausea)  prednisoLONE (Nausea)      FAMILY HISTORY:  No pertinent family history in first degree relatives      PHYSICAL EXAMINATION:  -----------------------------  T(C): 37.1 (07-17-17 @ 04:48), Max: 37.1 (07-17-17 @ 04:48)  HR: 66 (07-17-17 @ 04:48) (55 - 68)  BP: 123/62 (07-17-17 @ 04:48) (122/62 - 145/77)  RR: 16 (07-17-17 @ 04:48) (16 - 16)  SpO2: 99% (07-17-17 @ 04:48) (96% - 99%)  Wt(kg): --    07-16 @ 07:01  -  07-17 @ 07:00  --------------------------------------------------------  IN:    Oral Fluid: 360 mL  Total IN: 360 mL    OUT:  Total OUT: 0 mL    Total NET: 360 mL          Weight (kg): 51.9 (07-16 @ 16:57)    Constitutional: well developed, normal appearance, well groomed, well nourished, no deformities and no acute distress.   Eyes: the conjunctiva exhibited no abnormalities and the eyelids demonstrated no xanthelasmas.   HEENT: normal oral mucosa, no oral pallor and no oral cyanosis.   Neck: normal jugular venous A waves present, normal jugular venous V waves present and no jugular venous herron A waves.   Pulmonary: no respiratory distress, normal respiratory rhythm and effort, no accessory muscle use and lungs were clear to auscultation bilaterally.   Cardiovascular: heart rate and rhythm were normal, normal S1 and S2 and no murmur, gallop, rub, heave or thrill are present.   Abdomen: soft, non-tender, no hepato-splenomegaly and no abdominal mass palpated.   Musculoskeletal: the gait could not be assessed..   Extremities: no clubbing of the fingernails, no localized cyanosis, no petechial hemorrhages and no ischemic changes.   Skin: normal skin color and pigmentation, no rash, no venous stasis, no skin lesions, no skin ulcer and no xanthoma was observed.   Psychiatric: oriented to person, place, and time, the affect was normal, the mood was normal and not feeling anxious.     LABS:   --------  07-17    142  |  112<H>  |  89<H>  ----------------------------<  107<H>  3.7   |  16<L>  |  9.30<H>    Ca    7.4<L>      17 Jul 2017 06:37    TPro  6.9  /  Alb  3.1<L>  /  TBili  0.5  /  DBili  x   /  AST  15  /  ALT  16  /  AlkPhos  82  07-16                         9.8    8.7   )-----------( 185      ( 17 Jul 2017 06:37 )             29.5     PT/INR - ( 17 Jul 2017 06:37 )   PT: 10.2 sec;   INR: 0.94 ratio           07-16 @ 11:21 BNP: 5419 pg/mL            RADIOLOGY:  -----------------        ECG:
Patient is a 71y Male with past medical history of           presenting with        who reports no complaints overnight.    REVIEW OF SYSTEMS:    CONSTITUTIONAL: No weakness, fevers or chills  RESPIRATORY: No cough, wheezing, hemoptysis; No shortness of breath  CARDIOVASCULAR: No chest pain or palpitations  ABDOMEN: no abd pains, nausea or vomiting  GENITOURINARY: No dysuria, frequency or hematuria  All other review of systems is negative unless indicated above.    Allergies    Codeine Phosphate (Nausea)  morphine (Nausea)  prednisoLONE (Nausea)    Intolerances        MEDICATIONS  (STANDING):  heparin  Injectable 5000 Unit(s) SubCutaneous every 12 hours  piperacillin/tazobactam IVPB. 3.375 Gram(s) IV Intermittent every 12 hours  aspirin enteric coated 81 milliGRAM(s) Oral daily  sodium bicarbonate 650 milliGRAM(s) Oral two times a day  tamsulosin 0.4 milliGRAM(s) Oral at bedtime  metoprolol 12.5 milliGRAM(s) Oral daily  artificial  tears Solution 1 Drop(s) Both EYES daily  hydrALAZINE 50 milliGRAM(s) Oral three times a day  calcium carbonate 1250 mG + Vitamin D (OsCal 500 + D) 1 Tablet(s) Oral every week  ergocalciferol 67218 Unit(s) Oral <User Schedule>      Vitals:  T(F): 97.8 (07-17-17 @ 09:40), Max: 98.7 (07-17-17 @ 04:48)  HR: 63 (07-17-17 @ 09:40)  BP: 130/62 (07-17-17 @ 09:40)  BP(mean): --  RR: 17 (07-17-17 @ 09:40)  SpO2: 98% (07-17-17 @ 09:40)  Wt(kg): --    I and O's:    07-16 @ 07:01  -  07-17 @ 07:00  --------------------------------------------------------  IN: 360 mL / OUT: 0 mL / NET: 360 mL          Weight (kg): 51.9 (07-16 @ 16:57)    PHYSICAL EXAM:    Constitutional: NAD,   HEENT:  EOMI,  MMM  Neck: No JVD  Respiratory: CTAB  Cardiovascular: S1 and S2  Gastrointestinal: BS+, soft, NT/ND  Extremities: No peripheral edema  Neurological: A/O x 3, no focal deficits  Psychiatric: Normal mood, normal affect  : No Bell  Skin: No rashes  Dialysis Access: Not applicable    LABS:                        9.8    8.7   )-----------( 185      ( 17 Jul 2017 06:37 )             29.5                         10.8   9.6   )-----------( 207      ( 16 Jul 2017 11:21 )             33.2       142    |  112    |  89     ----------------------------<  107       17 Jul 2017 06:37  3.7     |  16     |  9.30     143    |  113    |  85     ----------------------------<  80        16 Jul 2017 11:21  4.5     |  13     |  9.40     Ca    7.4        17 Jul 2017 06:37  Ca    7.7        16 Jul 2017 11:21        TPro  6.9    /  Alb  3.1    /  TBili  0.5    /        16 Jul 2017 11:21  DBili  x      /  AST  15     /  ALT  16     /  AlkPhos  82           Serum Osmo:   Serum Uric Acid:   DANNI:   Double Stranded DNA:   C3:   C3 Nephritic Factor:   C4:   p-ANCA:   c-ANCA:   Anti-GBM:   JEIMY-Smith Antibody:   Immunofixation:   [place]/Lambda Free Light Chain:   SPEP:   Hepatitis Panel:   HIV-1/2:     Urine Studies:      Urine chemistry:   Urine Na:   Urine Creatinine:   Urine Protein/Cr ratio:  Urine K:   Urine Osm:   24 Hr urine studies:     24 hr urine Creatinine Clearance:    RADIOLOGY & ADDITIONAL STUDIES:
CARDIOLOGY CONSULT NOTE    Patient was seen in HD unit ,tolerates session well Complains of right elbow pain and swelling,xray demonstrated bursitis ,ortho eval is pending   denies cp,sob   Comfortable,NAD,VSS      REVIEW OF SYSTEMS:    CONSTITUTIONAL: No fever, weight loss, or fatigue  EYES: No eye pain, visual disturbances, or discharge  ENMT:  No difficulty hearing, tinnitus, vertigo; No sinus or throat pain  NECK: No pain or stiffness  BREASTS: No pain, masses, or nipple discharge  RESPIRATORY: No cough, wheezing, chills or hemoptysis; No shortness of breath  CARDIOVASCULAR: No chest pain, palpitations, dizziness, or leg swelling  GASTROINTESTINAL: No abdominal or epigastric pain. No nausea, vomiting, or hematemesis; No diarrhea or constipation. No melena or hematochezia.  GENITOURINARY: No dysuria, frequency, hematuria, or incontinence  NEUROLOGICAL: No headaches, memory loss, loss of strength, numbness, or tremors  SKIN: No itching, burning, rashes, or lesions   LYMPH NODES: No enlarged glands  ENDOCRINE: No heat or cold intolerance; No hair loss  MUSCULOSKELETAL: No joint pain or swelling; No muscle, back, or extremity pain  PSYCHIATRIC: No depression, anxiety, mood swings, or difficulty sleeping  HEME/LYMPH: No easy bruising, or bleeding gums  ALLERGY AND IMMUNOLOGIC: No hives or eczema    MEDICATIONS  (STANDING):  heparin  Injectable 5000 Unit(s) SubCutaneous every 12 hours  piperacillin/tazobactam IVPB. 3.375 Gram(s) IV Intermittent once  piperacillin/tazobactam IVPB. 3.375 Gram(s) IV Intermittent every 12 hours  aspirin enteric coated 81 milliGRAM(s) Oral daily  sodium bicarbonate 650 milliGRAM(s) Oral two times a day  tamsulosin 0.4 milliGRAM(s) Oral at bedtime  metoprolol 12.5 milliGRAM(s) Oral daily  artificial  tears Solution 1 Drop(s) Both EYES daily  hydrALAZINE 50 milliGRAM(s) Oral three times a day  calcium carbonate 1250 mG + Vitamin D (OsCal 500 + D) 1 Tablet(s) Oral every week  ergocalciferol 10773 Unit(s) Oral every week    MEDICATIONS  (PRN):      ALLERGIES: Codeine Phosphate (Nausea)  morphine (Nausea)  prednisoLONE (Nausea)      Social History:     FAMILY HISTORY:  No pertinent family history in first degree relatives      PAST MEDICAL & SURGICAL HISTORY:  Anemia  Hypertension  Renal failure  No significant past surgical history        PHYSICAL EXAMINATION:  -----------------------------  T(C): 37 (07-16-17 @ 10:20), Max: 37 (07-16-17 @ 10:20)  HR: 68 (07-16-17 @ 10:20) (68 - 68)  BP: 145/77 (07-16-17 @ 10:20) (145/77 - 145/77)  RR: 16 (07-16-17 @ 10:20) (16 - 16)  SpO2: 96% (07-16-17 @ 10:20) (96% - 96%)  Wt(kg): --      Weight (kg): 54.4 (07-16 @ 10:20)    Constitutional: underweight no deformities and no acute distress.   Eyes: the conjunctiva exhibited no abnormalities and the eyelids demonstrated no xanthelasmas.   HEENT: normal oral mucosa, no oral pallor and no oral cyanosis.   Neck: normal jugular venous A waves present, normal jugular venous V waves present and no jugular venous herron A waves.   Pulmonary: no respiratory distress, normal respiratory rhythm and effort, no accessory muscle use and lungs were clear to auscultation bilaterally.   Cardiovascular: heart rate and rhythm were normal, normal S1 and S2 and no murmur, gallop, rub, heave or thrill are present.   Abdomen: soft, non-tender, no hepato-splenomegaly and no abdominal mass palpated.   Musculoskeletal: the gait could not be assessed..   Extremities: no clubbing of the fingernails, no localized cyanosis, R ELBOW swelling and erythema ,decreased ROM due to pain  Skin: erythema of right elbow  Psychiatric: oriented to person, place, and time, the affect was normal, the mood was normal and not feeling anxious.     LABS:   --------                             10.8   9.6   )-----------( 207      ( 16 Jul 2017 11:21 )             33.2                 RADIOLOGY: Labs and imaging reviewed electronically   -----------------        ECG:     ECHO

## 2017-07-17 NOTE — PHYSICAL THERAPY INITIAL EVALUATION ADULT - PERTINENT HX OF CURRENT PROBLEM, REHAB EVAL
72 yo male presents to ER with complains of R elbow pain and swelling x 2 weeks ,denies trauma ,injury, xray demonstrated olecaranon bursitis

## 2017-07-17 NOTE — PROVIDER CONTACT NOTE (CHANGE IN STATUS NOTIFICATION) - ASSESSMENT
Patient refused assessment and refused observation to empty pouch via [place] 40 moffett catheter:

## 2017-07-17 NOTE — PROGRESS NOTE ADULT - PROBLEM SELECTOR PLAN 5
ortho cons called ,check crp ,esr ,pain management
Admit for septic workup and ID evaluation,send blood and urine cx,serial lactate levels,monitor vitals closley,ivfs hydration,monitor urine output and renal profile,iv abx as per id cons

## 2017-07-17 NOTE — PROGRESS NOTE ADULT - PROBLEM SELECTOR PROBLEM 1
Cellulitis
ESRD (end stage renal disease) on dialysis
Ileostomy dysfunction
Olecranon bursitis of right elbow

## 2017-07-17 NOTE — PHYSICAL THERAPY INITIAL EVALUATION ADULT - RANGE OF MOTION EXAMINATION, REHAB EVAL
bilateral lower extremity ROM was WFL (within functional limits)/R elbow slightly limited/bilateral upper extremity ROM was WFL (within functional limits)

## 2017-07-17 NOTE — PROGRESS NOTE ADULT - PROBLEM SELECTOR PLAN 3
continue current managmnet and medications ,Seen by Dr. Odonnell ( recommneded  asa and /or plavix inititation ( patient was not on antipaltelets before likely due to UC ,but he dosent remember hx of gib )
chronic
ANEMIA PLAN:  Anemia of chronic disease:  continue with epo    H and H subtherapeutic .  We will continue epo aiming for a HCT of 32-36 %.

## 2017-07-17 NOTE — DIETITIAN INITIAL EVALUATION ADULT. - OTHER INFO
Pt admitted with right elbow pain and swellling with cellulitis.  Tolerating diet with good po intake.  No n/v/d/c.

## 2017-07-17 NOTE — PROVIDER CONTACT NOTE (OTHER) - ACTION/TREATMENT ORDERED:
message left
Dr. Cabello called back and states she reviewed his chart and labs and is not going to d/c him today, if he wants to leave he has to sign out ama

## 2017-07-20 DIAGNOSIS — Z99.2 DEPENDENCE ON RENAL DIALYSIS: ICD-10-CM

## 2017-07-20 DIAGNOSIS — Z88.5 ALLERGY STATUS TO NARCOTIC AGENT: ICD-10-CM

## 2017-07-20 DIAGNOSIS — Z79.82 LONG TERM (CURRENT) USE OF ASPIRIN: ICD-10-CM
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
## 2017-07-20 DIAGNOSIS — M70.21 OLECRANON BURSITIS, RIGHT ELBOW: ICD-10-CM

## 2017-07-20 DIAGNOSIS — D63.1 ANEMIA IN CHRONIC KIDNEY DISEASE: ICD-10-CM

## 2017-07-20 DIAGNOSIS — I25.10 ATHEROSCLEROTIC HEART DISEASE OF NATIVE CORONARY ARTERY WITHOUT ANGINA PECTORIS: ICD-10-CM

## 2017-07-20 DIAGNOSIS — Z88.8 ALLERGY STATUS TO OTHER DRUGS, MEDICAMENTS AND BIOLOGICAL SUBSTANCES STATUS: ICD-10-CM

## 2017-07-20 DIAGNOSIS — R26.81 UNSTEADINESS ON FEET: ICD-10-CM

## 2017-07-20 DIAGNOSIS — G47.00 INSOMNIA, UNSPECIFIED: ICD-10-CM

## 2017-07-20 DIAGNOSIS — F32.9 MAJOR DEPRESSIVE DISORDER, SINGLE EPISODE, UNSPECIFIED: ICD-10-CM

## 2017-07-20 DIAGNOSIS — K94.13 ENTEROSTOMY MALFUNCTION: ICD-10-CM

## 2017-07-20 DIAGNOSIS — I12.0 HYPERTENSIVE CHRONIC KIDNEY DISEASE WITH STAGE 5 CHRONIC KIDNEY DISEASE OR END STAGE RENAL DISEASE: ICD-10-CM

## 2017-07-20 DIAGNOSIS — N18.6 END STAGE RENAL DISEASE: ICD-10-CM

## 2017-08-24 NOTE — DISCHARGE NOTE ADULT - SECONDARY DIAGNOSIS.
Hypertension Ileostomy dysfunction CAD (coronary artery disease) Anemia Cellulitis Olecranon bursitis of right elbow

## 2017-08-24 NOTE — DISCHARGE NOTE ADULT - PATIENT PORTAL LINK FT
“You can access the [facility] Patient Portal, offered by [facility], by registering with the following website: [email or web address]”

## 2017-08-24 NOTE — DISCHARGE NOTE ADULT - CARE PLAN
Principal Discharge DX:	ESRD (end stage renal disease) on dialysis  Secondary Diagnosis:	Hypertension  Secondary Diagnosis:	Ileostomy dysfunction  Secondary Diagnosis:	CAD (coronary artery disease)  Secondary Diagnosis:	Anemia  Secondary Diagnosis:	Cellulitis  Secondary Diagnosis:	Olecranon bursitis of right elbow

## 2019-08-01 ENCOUNTER — TRANSCRIPTION ENCOUNTER (OUTPATIENT)
Age: 74
End: 2019-08-01

## 2020-01-20 NOTE — PHYSICAL THERAPY INITIAL EVALUATION ADULT - GENERAL OBSERVATIONS, REHAB EVAL
CHIEF COMPLAINT: Tana Chi is a 50 year old female   who presents here today for   Chief Complaint   Patient presents with   • Gyn Exam     Well Woman            ROS  Review of Systems   Constitutional: Negative.    HENT: Negative.    Eyes: Negative.    Respiratory: Negative.    Cardiovascular: Negative.    Gastrointestinal: Negative.    Genitourinary: Negative.    Musculoskeletal: Negative.    Skin: Negative.    Neurological: Negative.    Endo/Heme/Allergies: Negative.    Psychiatric/Behavioral: Negative.           HPI:   Physical Exam   Constitutional: She is oriented to person, place, and time and well-developed, well-nourished, and in no distress. No distress.   HENT:   Head: Normocephalic and atraumatic.   Right Ear: External ear normal.   Left Ear: External ear normal.   Nose: Nose normal.   Mouth/Throat: Oropharynx is clear and moist. No oropharyngeal exudate.   Eyes: Pupils are equal, round, and reactive to light. Conjunctivae and EOM are normal. Right eye exhibits no discharge. Left eye exhibits no discharge. No scleral icterus.   Neck: Normal range of motion. Neck supple. No JVD present. No tracheal deviation present. No thyromegaly present.   Cardiovascular: Normal rate, regular rhythm, normal heart sounds and intact distal pulses. Exam reveals no gallop and no friction rub.   No murmur heard.  Pulmonary/Chest: Effort normal and breath sounds normal. No stridor. No respiratory distress. She has no wheezes. She has no rales. She exhibits no tenderness.   Abdominal: Soft. Bowel sounds are normal. She exhibits no distension and no mass. There is no tenderness. There is no rebound and no guarding.   Genitourinary:    Vagina, cervix, uterus, right adnexa and left adnexa normal.   Rectum:      Guaiac result negative.      No vaginal discharge.   Musculoskeletal: Normal range of motion.         General: No tenderness, deformity or edema.     Lymphadenopathy:     She has no cervical adenopathy.    Neurological: She is alert and oriented to person, place, and time. She has normal reflexes. She displays normal reflexes. No cranial nerve deficit. She exhibits normal muscle tone. Gait normal. Coordination normal. GCS score is 15.   Skin: Skin is warm and dry. No rash noted. She is not diaphoretic. No erythema. No pallor.   Psychiatric: Mood, memory, affect and judgment normal.   Nursing note and vitals reviewed.         OB/GYN HISTORY  OB History    Para Term  AB Living   5 3 3 0 2 3   SAB TAB Ectopic Molar Multiple Live Births   2 0 0 0 0 3       Menstrual History  Patient's last menstrual period was 2020 (exact date).        HISTORY  Past Medical History:   Diagnosis Date   • Pap smear abnormality of cervix       Past Surgical History:   Procedure Laterality Date   • Colonoscopy diagnostic     • Dilation and curettage      sab     Current Outpatient Medications   Medication Sig Dispense Refill   • Calcium Carb-Cholecalciferol (CALCIUM + D3) 600-200 MG-UNIT Tab Take 1 tablet by mouth every 24 hours.     • albuterol 108 (90 Base) MCG/ACT inhaler Inhale 2 puffs into the lungs every 4 hours.       No current facility-administered medications for this visit.      ALLERGIES:  No Known Allergies  History reviewed. No pertinent family history.  Social History     Tobacco Use   • Smoking status: Never Smoker   • Smokeless tobacco: Never Used   Substance Use Topics   • Alcohol use: Yes     Frequency: Monthly or less     Drinks per session: 1 or 2     Binge frequency: Never   • Drug use: Never      Histories were reviewed and updated during today's visit.      ASSESSMENT/PLAN:  No problem-specific Assessment & Plan notes found for this encounter.       Return in about 1 year (around 2021).        Tonya Rondon MD  2020   Pt rec'd in bed, NAD noted

## 2020-03-06 ENCOUNTER — INPATIENT (INPATIENT)
Facility: HOSPITAL | Age: 75
LOS: 2 days | DRG: 871 | End: 2020-03-09
Attending: INTERNAL MEDICINE | Admitting: INTERNAL MEDICINE
Payer: MEDICARE

## 2020-03-06 VITALS
DIASTOLIC BLOOD PRESSURE: 56 MMHG | TEMPERATURE: 98 F | HEIGHT: 67 IN | WEIGHT: 74.96 LBS | RESPIRATION RATE: 18 BRPM | HEART RATE: 96 BPM | SYSTOLIC BLOOD PRESSURE: 92 MMHG

## 2020-03-06 DIAGNOSIS — K92.0 HEMATEMESIS: ICD-10-CM

## 2020-03-06 LAB
ADD ON TEST-SPECIMEN IN LAB: SIGNIFICANT CHANGE UP
BASOPHILS # BLD AUTO: 0.01 K/UL — SIGNIFICANT CHANGE UP (ref 0–0.2)
BASOPHILS NFR BLD AUTO: 0.1 % — SIGNIFICANT CHANGE UP (ref 0–2)
EOSINOPHIL # BLD AUTO: 0 K/UL — SIGNIFICANT CHANGE UP (ref 0–0.5)
EOSINOPHIL NFR BLD AUTO: 0 % — SIGNIFICANT CHANGE UP (ref 0–6)
HCT VFR BLD CALC: 35.6 % — LOW (ref 39–50)
HGB BLD-MCNC: 11.5 G/DL — LOW (ref 13–17)
IMM GRANULOCYTES NFR BLD AUTO: 0.5 % — SIGNIFICANT CHANGE UP (ref 0–1.5)
LYMPHOCYTES # BLD AUTO: 0.77 K/UL — LOW (ref 1–3.3)
LYMPHOCYTES # BLD AUTO: 5.2 % — LOW (ref 13–44)
MCHC RBC-ENTMCNC: 32.3 GM/DL — SIGNIFICANT CHANGE UP (ref 32–36)
MCHC RBC-ENTMCNC: 35.4 PG — HIGH (ref 27–34)
MCV RBC AUTO: 109.5 FL — HIGH (ref 80–100)
MONOCYTES # BLD AUTO: 1.22 K/UL — HIGH (ref 0–0.9)
MONOCYTES NFR BLD AUTO: 8.2 % — SIGNIFICANT CHANGE UP (ref 2–14)
NEUTROPHILS # BLD AUTO: 12.88 K/UL — HIGH (ref 1.8–7.4)
NEUTROPHILS NFR BLD AUTO: 86 % — HIGH (ref 43–77)
PLATELET # BLD AUTO: 68 K/UL — LOW (ref 150–400)
RBC # BLD: 3.25 M/UL — LOW (ref 4.2–5.8)
RBC # FLD: 23.2 % — HIGH (ref 10.3–14.5)
WBC # BLD: 14.95 K/UL — HIGH (ref 3.8–10.5)
WBC # FLD AUTO: 14.95 K/UL — HIGH (ref 3.8–10.5)

## 2020-03-06 PROCEDURE — 93005 ELECTROCARDIOGRAM TRACING: CPT

## 2020-03-06 PROCEDURE — 82140 ASSAY OF AMMONIA: CPT

## 2020-03-06 PROCEDURE — 82962 GLUCOSE BLOOD TEST: CPT

## 2020-03-06 PROCEDURE — 83735 ASSAY OF MAGNESIUM: CPT

## 2020-03-06 PROCEDURE — 74176 CT ABD & PELVIS W/O CONTRAST: CPT | Mod: 26

## 2020-03-06 PROCEDURE — 85610 PROTHROMBIN TIME: CPT

## 2020-03-06 PROCEDURE — 83970 ASSAY OF PARATHORMONE: CPT

## 2020-03-06 PROCEDURE — 82310 ASSAY OF CALCIUM: CPT

## 2020-03-06 PROCEDURE — 84145 PROCALCITONIN (PCT): CPT

## 2020-03-06 PROCEDURE — 84100 ASSAY OF PHOSPHORUS: CPT

## 2020-03-06 PROCEDURE — 94660 CPAP INITIATION&MGMT: CPT

## 2020-03-06 PROCEDURE — 82803 BLOOD GASES ANY COMBINATION: CPT

## 2020-03-06 PROCEDURE — 74177 CT ABD & PELVIS W/CONTRAST: CPT

## 2020-03-06 PROCEDURE — 71045 X-RAY EXAM CHEST 1 VIEW: CPT

## 2020-03-06 PROCEDURE — 84484 ASSAY OF TROPONIN QUANT: CPT

## 2020-03-06 PROCEDURE — 36415 COLL VENOUS BLD VENIPUNCTURE: CPT

## 2020-03-06 PROCEDURE — 85027 COMPLETE CBC AUTOMATED: CPT

## 2020-03-06 PROCEDURE — 71045 X-RAY EXAM CHEST 1 VIEW: CPT | Mod: 26

## 2020-03-06 PROCEDURE — 93306 TTE W/DOPPLER COMPLETE: CPT

## 2020-03-06 PROCEDURE — 90935 HEMODIALYSIS ONE EVALUATION: CPT

## 2020-03-06 PROCEDURE — 71250 CT THORAX DX C-: CPT | Mod: 26

## 2020-03-06 PROCEDURE — 85025 COMPLETE CBC W/AUTO DIFF WBC: CPT

## 2020-03-06 PROCEDURE — 93010 ELECTROCARDIOGRAM REPORT: CPT

## 2020-03-06 PROCEDURE — 82550 ASSAY OF CK (CPK): CPT

## 2020-03-06 PROCEDURE — 87040 BLOOD CULTURE FOR BACTERIA: CPT

## 2020-03-06 PROCEDURE — 94640 AIRWAY INHALATION TREATMENT: CPT

## 2020-03-06 PROCEDURE — 80074 ACUTE HEPATITIS PANEL: CPT

## 2020-03-06 PROCEDURE — 76705 ECHO EXAM OF ABDOMEN: CPT

## 2020-03-06 PROCEDURE — 80053 COMPREHEN METABOLIC PANEL: CPT

## 2020-03-06 PROCEDURE — C9113: CPT

## 2020-03-06 PROCEDURE — 83605 ASSAY OF LACTIC ACID: CPT

## 2020-03-06 PROCEDURE — P9047: CPT

## 2020-03-06 PROCEDURE — 36600 WITHDRAWAL OF ARTERIAL BLOOD: CPT

## 2020-03-06 PROCEDURE — 92610 EVALUATE SWALLOWING FUNCTION: CPT | Mod: GN

## 2020-03-06 RX ORDER — PANTOPRAZOLE SODIUM 20 MG/1
40 TABLET, DELAYED RELEASE ORAL ONCE
Refills: 0 | Status: COMPLETED | OUTPATIENT
Start: 2020-03-06 | End: 2020-03-06

## 2020-03-06 RX ORDER — ALBUTEROL 90 UG/1
3 AEROSOL, METERED ORAL
Qty: 0 | Refills: 0 | DISCHARGE

## 2020-03-06 RX ORDER — CHOLECALCIFEROL (VITAMIN D3) 125 MCG
1 CAPSULE ORAL
Qty: 0 | Refills: 0 | DISCHARGE

## 2020-03-06 RX ORDER — SEVELAMER CARBONATE 2400 MG/1
2 POWDER, FOR SUSPENSION ORAL
Qty: 0 | Refills: 0 | DISCHARGE

## 2020-03-06 RX ORDER — FOLIC ACID 0.8 MG
1 TABLET ORAL
Qty: 0 | Refills: 0 | DISCHARGE

## 2020-03-06 RX ORDER — ZINC SULFATE TAB 220 MG (50 MG ZINC EQUIVALENT) 220 (50 ZN) MG
1 TAB ORAL
Qty: 0 | Refills: 0 | DISCHARGE

## 2020-03-06 RX ORDER — IPRATROPIUM BROMIDE 0.2 MG/ML
2 SOLUTION, NON-ORAL INHALATION
Qty: 0 | Refills: 0 | DISCHARGE

## 2020-03-06 RX ORDER — AZITHROMYCIN 500 MG/1
500 TABLET, FILM COATED ORAL ONCE
Refills: 0 | Status: COMPLETED | OUTPATIENT
Start: 2020-03-06 | End: 2020-03-06

## 2020-03-06 RX ORDER — ASCORBIC ACID 60 MG
1 TABLET,CHEWABLE ORAL
Qty: 0 | Refills: 0 | DISCHARGE

## 2020-03-06 RX ORDER — ACETAMINOPHEN 500 MG
2 TABLET ORAL
Qty: 0 | Refills: 0 | DISCHARGE

## 2020-03-06 RX ORDER — MIDODRINE HYDROCHLORIDE 2.5 MG/1
1 TABLET ORAL
Qty: 0 | Refills: 0 | DISCHARGE

## 2020-03-06 RX ORDER — CEFTRIAXONE 500 MG/1
1000 INJECTION, POWDER, FOR SOLUTION INTRAMUSCULAR; INTRAVENOUS ONCE
Refills: 0 | Status: COMPLETED | OUTPATIENT
Start: 2020-03-06 | End: 2020-03-06

## 2020-03-06 RX ORDER — SODIUM CHLORIDE 9 MG/ML
500 INJECTION INTRAMUSCULAR; INTRAVENOUS; SUBCUTANEOUS ONCE
Refills: 0 | Status: COMPLETED | OUTPATIENT
Start: 2020-03-06 | End: 2020-03-06

## 2020-03-06 RX ORDER — QUETIAPINE FUMARATE 200 MG/1
0.5 TABLET, FILM COATED ORAL
Qty: 0 | Refills: 0 | DISCHARGE

## 2020-03-06 RX ORDER — ALBUTEROL 90 UG/1
2.5 AEROSOL, METERED ORAL ONCE
Refills: 0 | Status: COMPLETED | OUTPATIENT
Start: 2020-03-06 | End: 2020-03-06

## 2020-03-06 RX ORDER — LACTOBACILLUS ACIDOPHILUS 100MM CELL
2 CAPSULE ORAL
Qty: 0 | Refills: 0 | DISCHARGE

## 2020-03-06 RX ORDER — PREGABALIN 225 MG/1
1 CAPSULE ORAL
Qty: 0 | Refills: 0 | DISCHARGE

## 2020-03-06 RX ORDER — THIAMINE MONONITRATE (VIT B1) 100 MG
1 TABLET ORAL
Qty: 0 | Refills: 0 | DISCHARGE

## 2020-03-06 RX ORDER — APIXABAN 2.5 MG/1
1 TABLET, FILM COATED ORAL
Qty: 0 | Refills: 0 | DISCHARGE

## 2020-03-06 RX ADMIN — PANTOPRAZOLE SODIUM 40 MILLIGRAM(S): 20 TABLET, DELAYED RELEASE ORAL at 19:35

## 2020-03-06 RX ADMIN — SODIUM CHLORIDE 500 MILLILITER(S): 9 INJECTION INTRAMUSCULAR; INTRAVENOUS; SUBCUTANEOUS at 19:00

## 2020-03-06 NOTE — ED PROVIDER NOTE - OBJECTIVE STATEMENT
75 y/o male with PMHx of anemia, HTN, and renal failure presents to the ED BIBEMS from [facility] for evaluation of +hematemesis beginning today. Says it has happened in the past, unsure why. Denies any pain. No fever. Allergic to codeine phosphate, morphine, and prednisoLONE.

## 2020-03-06 NOTE — ED ADULT NURSE NOTE - NSIMPLEMENTINTERV_GEN_ALL_ED
Implemented All Fall with Harm Risk Interventions:  [place] to call system. Call bell, personal items and telephone within reach. Instruct patient to call for assistance. Room bathroom lighting operational. Non-slip footwear when patient is off stretcher. Physically safe environment: no spills, clutter or unnecessary equipment. Stretcher in lowest position, wheels locked, appropriate side rails in place. Provide visual cue, wrist band, yellow gown, etc. Monitor gait and stability. Monitor for mental status changes and reorient to person, place, and time. Review medications for side effects contributing to fall risk. Reinforce activity limits and safety measures with patient and family. Provide visual clues: red socks.

## 2020-03-06 NOTE — ED PROVIDER NOTE - CONSTITUTIONAL, MLM
normal... Cachectic appearing, awake, alert, oriented to person, place, time/situation and in no apparent distress.

## 2020-03-06 NOTE — ED PROVIDER NOTE - PROGRESS NOTE DETAILS
signed Marlyn Parish PA-C   Spoke to supervisor Simone at [facility] [phone] signed Marlyn Parish PA-C   Spoke to supervisor Simone at [employer] [phone]. Pt is somewhat lethargic at baseline since they received him at their facility on 3/3. Pt is oriented to person and sometimes place and can recognize a few people. Pt is not normally on O2 but they always had difficulty getting O2 sats and have so been keeping him on 2L NC. Today at 4:30 pt vomited 2 times coffee ground emesis. Pt on eliquis. PMH COPD, Aortic stenosis, PVD, A fib, NSTEMI, ESRD on HD TTS, last HD yesterday.   In ED unable to elicit any meaningful hx from pt. Pt has dried maroon vomitus on face and gown. Colostomy bag with normal appearing stool in it. signed Marlyn Parish PA-C   Tele ICU consult with Dr Verneice Cochran for apparent coffee ground emesis, hypotension, elevated INR and LFTs, CT with pulmonary edema and effusions. No recent prior labs or imaging to compare. Dr Cochran recommends abx rocephin azithromycin for pulmonary findings and proventil neb. Repeat bloodwork including CBC, ammonia, lactate, procalcitonin. May admit to medicine. Pts BP now 116/70. Difficult to get O2 sat but nurse states it was 98% earlier. Case discussed with and admission accepted by hospitalist Dr. Goddard, pt to go to stepdown.

## 2020-03-06 NOTE — ED PROVIDER NOTE - CARE PLAN
Principal Discharge DX:	Coffee ground emesis  Secondary Diagnosis:	Pleural effusion  Secondary Diagnosis:	Pulmonary edema  Secondary Diagnosis:	LFT elevation

## 2020-03-06 NOTE — ED PROVIDER NOTE - NS_ ATTENDINGSCRIBEDETAILS _ED_A_ED_FT
I, Jose Reis MD,  performed the initial face to face bedside interview with this patient regarding history of present illness, review of symptoms and relevant past medical, social and family history.  I completed an independent physical examination.    The history, relevant review of systems, past medical and surgical history, medical decision making, and physical examination was documented by the scribe in my presence and I attest to the accuracy of the documentation.

## 2020-03-06 NOTE — CHART NOTE - NSCHARTNOTEFT_GEN_A_CORE
eicu consult note - case discussed with ER PA over the camera   75 yo male with h/o ESRD send from rehab with coffee ground vomiting . I n ER pt was hypotensive which improved with fluids , pt confused which is his baseline . No hematemesis  in ER. patient was found to have increase WBC with toxic granulations, increase LFT's and INR   CXR and CT chest showed bilateral pleural effusion and increase PVC  A/p   Gi bleed   increase LFT's   coagulopathy   thrombocytopenia   bilateral pleural effusion   ? Pneumonia  ESRD  Reccomendations   repeat CBC   procalcitonin , NH3 level , lactic acid   IV fluids if MAP < 65   if active bleeding give FFP and platelts- No active bleeding at present   start on rocephin & zithromax   cardiac enzymes   patient hemodynamically stable now   does not require ICU admission at present

## 2020-03-06 NOTE — ED PROVIDER NOTE - ENMT, MLM
Airway patent, Nasal mucosa clear. Mouth with normal mucosa. Throat has no vesicles, no oropharyngeal exudates and uvula is midline. +dried blood to mouth and teeth

## 2020-03-06 NOTE — ED ADULT NURSE NOTE - NSFALLRSKHRMRISKTYPE_ED_ALL_ED
other/surgery(72hr postop, recent leg amputee, sig. abd/thor surg)/coagulation(Bleeding disorder R/T clinical cond/anti-coags) coagulation(Bleeding disorder R/T clinical cond/anti-coags)/surgery(72hr postop, recent leg amputee, sig. abd/thor surg)

## 2020-03-06 NOTE — ED ADULT TRIAGE NOTE - CHIEF COMPLAINT QUOTE
Patient comes to ED for vomiting blood that began today. patient has a right fistula. patient has a hx of COPD. Patient denies any pain or discomfort.

## 2020-03-06 NOTE — ED ADULT NURSE NOTE - OBJECTIVE STATEMENT
pt is a 75 y/o male w/ pmhx of anemia, HTN, ESRD on dialysis, w/ ileostomy presenting to ED for eval of coffee ground emesis since this afternoon. pt notes abd pain and nausea w/ dyspnea. pt denies HA, chest pain, or palpitations. pt is a 73 y/o male w/ pmhx of anemia, HTN, ESRD on dialysis, w/ ileostomy, on home [id number] to ED for eval of coffee ground emesis since this afternoon. pt notes abd pain and nausea w/ dyspnea. pt denies HA, chest pain, or palpitations.

## 2020-03-07 DIAGNOSIS — J18.9 PNEUMONIA, UNSPECIFIED ORGANISM: ICD-10-CM

## 2020-03-07 DIAGNOSIS — I50.20 UNSPECIFIED SYSTOLIC (CONGESTIVE) HEART FAILURE: ICD-10-CM

## 2020-03-07 DIAGNOSIS — R57.9 SHOCK, UNSPECIFIED: ICD-10-CM

## 2020-03-07 DIAGNOSIS — J90 PLEURAL EFFUSION, NOT ELSEWHERE CLASSIFIED: ICD-10-CM

## 2020-03-07 DIAGNOSIS — J96.01 ACUTE RESPIRATORY FAILURE WITH HYPOXIA: ICD-10-CM

## 2020-03-07 DIAGNOSIS — N18.6 END STAGE RENAL DISEASE: ICD-10-CM

## 2020-03-07 DIAGNOSIS — A41.9 SEPSIS, UNSPECIFIED ORGANISM: ICD-10-CM

## 2020-03-07 DIAGNOSIS — K92.0 HEMATEMESIS: ICD-10-CM

## 2020-03-07 LAB
ALBUMIN SERPL ELPH-MCNC: 2.6 G/DL — LOW (ref 3.3–5)
ALP SERPL-CCNC: 108 U/L — SIGNIFICANT CHANGE UP (ref 40–120)
ALT FLD-CCNC: 251 U/L — HIGH (ref 12–78)
AMMONIA BLD-MCNC: 42 UMOL/L — HIGH (ref 11–32)
ANION GAP SERPL CALC-SCNC: 15 MMOL/L — SIGNIFICANT CHANGE UP (ref 5–17)
AST SERPL-CCNC: 211 U/L — HIGH (ref 15–37)
BASE EXCESS BLDA CALC-SCNC: 2.2 MMOL/L — HIGH (ref -2–2)
BASE EXCESS BLDA CALC-SCNC: 2.4 MMOL/L — HIGH (ref -2–2)
BASE EXCESS BLDV CALC-SCNC: 4.1 MMOL/L — HIGH (ref -2–2)
BILIRUB SERPL-MCNC: 2 MG/DL — HIGH (ref 0.2–1.2)
BLOOD GAS COMMENTS ARTERIAL: SIGNIFICANT CHANGE UP
BUN SERPL-MCNC: 56 MG/DL — HIGH (ref 7–23)
CALCIUM SERPL-MCNC: 9.5 MG/DL — SIGNIFICANT CHANGE UP (ref 8.5–10.1)
CHLORIDE SERPL-SCNC: 102 MMOL/L — SIGNIFICANT CHANGE UP (ref 96–108)
CO2 SERPL-SCNC: 26 MMOL/L — SIGNIFICANT CHANGE UP (ref 22–31)
CREAT SERPL-MCNC: 5.67 MG/DL — HIGH (ref 0.5–1.3)
GAS PNL BLDA: SIGNIFICANT CHANGE UP
GAS PNL BLDA: SIGNIFICANT CHANGE UP
GLUCOSE SERPL-MCNC: 97 MG/DL — SIGNIFICANT CHANGE UP (ref 70–99)
HAV IGM SER-ACNC: SIGNIFICANT CHANGE UP
HBV CORE IGM SER-ACNC: SIGNIFICANT CHANGE UP
HBV SURFACE AG SER-ACNC: SIGNIFICANT CHANGE UP
HCO3 BLDA-SCNC: 26 MMOL/L — SIGNIFICANT CHANGE UP (ref 21–29)
HCO3 BLDA-SCNC: 27 MMOL/L — SIGNIFICANT CHANGE UP (ref 21–29)
HCO3 BLDV-SCNC: 29 MMOL/L — SIGNIFICANT CHANGE UP (ref 21–29)
HCT VFR BLD CALC: 35.3 % — LOW (ref 39–50)
HCT VFR BLD CALC: 40.5 % — SIGNIFICANT CHANGE UP (ref 39–50)
HCT VFR BLD CALC: 41.2 % — SIGNIFICANT CHANGE UP (ref 39–50)
HCV AB S/CO SERPL IA: 0.24 S/CO — SIGNIFICANT CHANGE UP (ref 0–0.99)
HCV AB SERPL-IMP: SIGNIFICANT CHANGE UP
HGB BLD-MCNC: 11.4 G/DL — LOW (ref 13–17)
HGB BLD-MCNC: 13 G/DL — SIGNIFICANT CHANGE UP (ref 13–17)
HGB BLD-MCNC: 13.6 G/DL — SIGNIFICANT CHANGE UP (ref 13–17)
HOROWITZ INDEX BLDA+IHG-RTO: SIGNIFICANT CHANGE UP
INR BLD: 3.95 RATIO — HIGH (ref 0.88–1.16)
LACTATE SERPL-SCNC: 2.1 MMOL/L — HIGH (ref 0.7–2)
LACTATE SERPL-SCNC: 5.6 MMOL/L — CRITICAL HIGH (ref 0.7–2)
MAGNESIUM SERPL-MCNC: 2.5 MG/DL — SIGNIFICANT CHANGE UP (ref 1.6–2.6)
MCHC RBC-ENTMCNC: 32.1 GM/DL — SIGNIFICANT CHANGE UP (ref 32–36)
MCHC RBC-ENTMCNC: 32.3 GM/DL — SIGNIFICANT CHANGE UP (ref 32–36)
MCHC RBC-ENTMCNC: 33 GM/DL — SIGNIFICANT CHANGE UP (ref 32–36)
MCHC RBC-ENTMCNC: 34.3 PG — HIGH (ref 27–34)
MCHC RBC-ENTMCNC: 34.4 PG — HIGH (ref 27–34)
MCHC RBC-ENTMCNC: 35 PG — HIGH (ref 27–34)
MCV RBC AUTO: 104.3 FL — HIGH (ref 80–100)
MCV RBC AUTO: 106.3 FL — HIGH (ref 80–100)
MCV RBC AUTO: 109.2 FL — HIGH (ref 80–100)
PCO2 BLDA: 38 MMHG — SIGNIFICANT CHANGE UP (ref 32–46)
PCO2 BLDA: 46 MMHG — SIGNIFICANT CHANGE UP (ref 32–46)
PCO2 BLDV: 45 MMHG — SIGNIFICANT CHANGE UP (ref 35–50)
PH BLDA: 7.39 — SIGNIFICANT CHANGE UP (ref 7.35–7.45)
PH BLDA: 7.44 — SIGNIFICANT CHANGE UP (ref 7.35–7.45)
PH BLDV: 7.42 — SIGNIFICANT CHANGE UP (ref 7.35–7.45)
PLATELET # BLD AUTO: 70 K/UL — LOW (ref 150–400)
PLATELET # BLD AUTO: 72 K/UL — LOW (ref 150–400)
PLATELET # BLD AUTO: 84 K/UL — LOW (ref 150–400)
PO2 BLDA: 33 MMHG — CRITICAL LOW (ref 74–108)
PO2 BLDA: 394 MMHG — HIGH (ref 74–108)
PO2 BLDV: 72 MMHG — HIGH (ref 25–45)
POTASSIUM SERPL-MCNC: 5.6 MMOL/L — HIGH (ref 3.5–5.3)
POTASSIUM SERPL-SCNC: 5.6 MMOL/L — HIGH (ref 3.5–5.3)
PROCALCITONIN SERPL-MCNC: 2.43 NG/ML — HIGH (ref 0.02–0.1)
PROCALCITONIN SERPL-MCNC: 2.44 NG/ML — HIGH (ref 0.02–0.1)
PROT SERPL-MCNC: 5.6 GM/DL — LOW (ref 6–8.3)
PROTHROM AB SERPL-ACNC: 45.7 SEC — HIGH (ref 10–12.9)
RBC # BLD: 3.32 M/UL — LOW (ref 4.2–5.8)
RBC # BLD: 3.71 M/UL — LOW (ref 4.2–5.8)
RBC # BLD: 3.95 M/UL — LOW (ref 4.2–5.8)
RBC # FLD: 22.8 % — HIGH (ref 10.3–14.5)
RBC # FLD: 22.9 % — HIGH (ref 10.3–14.5)
RBC # FLD: 23.5 % — HIGH (ref 10.3–14.5)
SAO2 % BLDA: 100 % — HIGH (ref 92–96)
SAO2 % BLDA: 49 % — LOW (ref 92–96)
SAO2 % BLDV: 93 % — HIGH (ref 67–88)
SODIUM SERPL-SCNC: 143 MMOL/L — SIGNIFICANT CHANGE UP (ref 135–145)
TROPONIN I SERPL-MCNC: 0.35 NG/ML — HIGH (ref 0.01–0.04)
WBC # BLD: 13.83 K/UL — HIGH (ref 3.8–10.5)
WBC # BLD: 15.22 K/UL — HIGH (ref 3.8–10.5)
WBC # BLD: 15.76 K/UL — HIGH (ref 3.8–10.5)
WBC # FLD AUTO: 13.83 K/UL — HIGH (ref 3.8–10.5)
WBC # FLD AUTO: 15.22 K/UL — HIGH (ref 3.8–10.5)
WBC # FLD AUTO: 15.76 K/UL — HIGH (ref 3.8–10.5)

## 2020-03-07 PROCEDURE — 93306 TTE W/DOPPLER COMPLETE: CPT | Mod: 26

## 2020-03-07 PROCEDURE — 99223 1ST HOSP IP/OBS HIGH 75: CPT

## 2020-03-07 PROCEDURE — 99291 CRITICAL CARE FIRST HOUR: CPT

## 2020-03-07 PROCEDURE — 71045 X-RAY EXAM CHEST 1 VIEW: CPT | Mod: 26

## 2020-03-07 PROCEDURE — 93010 ELECTROCARDIOGRAM REPORT: CPT

## 2020-03-07 RX ORDER — CEFTRIAXONE 500 MG/1
1000 INJECTION, POWDER, FOR SOLUTION INTRAMUSCULAR; INTRAVENOUS EVERY 24 HOURS
Refills: 0 | Status: DISCONTINUED | OUTPATIENT
Start: 2020-03-07 | End: 2020-03-09

## 2020-03-07 RX ORDER — PHYTONADIONE (VIT K1) 5 MG
10 TABLET ORAL ONCE
Refills: 0 | Status: COMPLETED | OUTPATIENT
Start: 2020-03-07 | End: 2020-03-07

## 2020-03-07 RX ORDER — IPRATROPIUM/ALBUTEROL SULFATE 18-103MCG
3 AEROSOL WITH ADAPTER (GRAM) INHALATION EVERY 6 HOURS
Refills: 0 | Status: DISCONTINUED | OUTPATIENT
Start: 2020-03-07 | End: 2020-03-09

## 2020-03-07 RX ORDER — ALBUMIN HUMAN 25 %
50 VIAL (ML) INTRAVENOUS
Refills: 0 | Status: DISCONTINUED | OUTPATIENT
Start: 2020-03-07 | End: 2020-03-09

## 2020-03-07 RX ORDER — ONDANSETRON 8 MG/1
4 TABLET, FILM COATED ORAL ONCE
Refills: 0 | Status: DISCONTINUED | OUTPATIENT
Start: 2020-03-07 | End: 2020-03-09

## 2020-03-07 RX ORDER — PANTOPRAZOLE SODIUM 20 MG/1
40 TABLET, DELAYED RELEASE ORAL EVERY 12 HOURS
Refills: 0 | Status: DISCONTINUED | OUTPATIENT
Start: 2020-03-07 | End: 2020-03-09

## 2020-03-07 RX ORDER — AZITHROMYCIN 500 MG/1
500 TABLET, FILM COATED ORAL EVERY 24 HOURS
Refills: 0 | Status: DISCONTINUED | OUTPATIENT
Start: 2020-03-07 | End: 2020-03-09

## 2020-03-07 RX ORDER — PHENYLEPHRINE HYDROCHLORIDE 10 MG/ML
0.5 INJECTION INTRAVENOUS
Qty: 160 | Refills: 0 | Status: DISCONTINUED | OUTPATIENT
Start: 2020-03-07 | End: 2020-03-09

## 2020-03-07 RX ORDER — MIDODRINE HYDROCHLORIDE 2.5 MG/1
5 TABLET ORAL EVERY 8 HOURS
Refills: 0 | Status: DISCONTINUED | OUTPATIENT
Start: 2020-03-07 | End: 2020-03-08

## 2020-03-07 RX ORDER — NOREPINEPHRINE BITARTRATE/D5W 8 MG/250ML
0.05 PLASTIC BAG, INJECTION (ML) INTRAVENOUS
Qty: 8 | Refills: 0 | Status: DISCONTINUED | OUTPATIENT
Start: 2020-03-07 | End: 2020-03-07

## 2020-03-07 RX ORDER — SEVELAMER CARBONATE 2400 MG/1
1600 POWDER, FOR SUSPENSION ORAL
Refills: 0 | Status: DISCONTINUED | OUTPATIENT
Start: 2020-03-07 | End: 2020-03-09

## 2020-03-07 RX ADMIN — CEFTRIAXONE 1000 MILLIGRAM(S): 500 INJECTION, POWDER, FOR SOLUTION INTRAMUSCULAR; INTRAVENOUS at 16:57

## 2020-03-07 RX ADMIN — ALBUTEROL 2.5 MILLIGRAM(S): 90 AEROSOL, METERED ORAL at 00:31

## 2020-03-07 RX ADMIN — PANTOPRAZOLE SODIUM 40 MILLIGRAM(S): 20 TABLET, DELAYED RELEASE ORAL at 17:16

## 2020-03-07 RX ADMIN — Medication 50 MILLILITER(S): at 11:57

## 2020-03-07 RX ADMIN — Medication 50 MILLILITER(S): at 09:43

## 2020-03-07 RX ADMIN — Medication 3 MILLILITER(S): at 20:35

## 2020-03-07 RX ADMIN — Medication 50 MILLILITER(S): at 10:41

## 2020-03-07 RX ADMIN — AZITHROMYCIN 255 MILLIGRAM(S): 500 TABLET, FILM COATED ORAL at 17:02

## 2020-03-07 RX ADMIN — Medication 3.19 MICROGRAM(S)/KG/MIN: at 03:49

## 2020-03-07 RX ADMIN — PHENYLEPHRINE HYDROCHLORIDE 3.19 MICROGRAM(S)/KG/MIN: 10 INJECTION INTRAVENOUS at 04:37

## 2020-03-07 RX ADMIN — CEFTRIAXONE 1000 MILLIGRAM(S): 500 INJECTION, POWDER, FOR SOLUTION INTRAMUSCULAR; INTRAVENOUS at 00:31

## 2020-03-07 RX ADMIN — PHENYLEPHRINE HYDROCHLORIDE 3.19 MICROGRAM(S)/KG/MIN: 10 INJECTION INTRAVENOUS at 08:32

## 2020-03-07 RX ADMIN — Medication 102 MILLIGRAM(S): at 10:26

## 2020-03-07 RX ADMIN — AZITHROMYCIN 255 MILLIGRAM(S): 500 TABLET, FILM COATED ORAL at 00:31

## 2020-03-07 NOTE — CONSULT NOTE ADULT - PROBLEM SELECTOR RECOMMENDATION 3
Reported reduced EF with possible valvulopathy in contrast to Echo from 2016 reported above. Will review. Continue inotropic support as necessary.

## 2020-03-07 NOTE — DIETITIAN INITIAL EVALUATION ADULT. - PROBLEM SELECTOR PLAN 3
-Patient currently on non invasive vent support  -titrate settings to maintain SaO2 >90%, or pH >7.25  -repeat ABG and monitor work of breathing   -Peridex oral care and VAP prophylaxis with HOB 30 degrees   -aggressive chest PT and suctioning

## 2020-03-07 NOTE — CONSULT NOTE ADULT - ASSESSMENT
73 yo man with ESRD, HTN and unclear cardiac hx admitted with shock and resp failure.  Etiology of shock unclear at this point,  septic vs cardiogenic.  HD today for fluid removal to stabilize resp status.  Echo to assess LV function.  Continue pressor support and abtx.

## 2020-03-07 NOTE — H&P ADULT - ATTENDING COMMENTS
case d/w AJAY Rodriguez at 7:00 am sign out.  Agree with admission to ICU given hypotension/shock and need for vasopressors as well as need for respiratory support with NIPPV.

## 2020-03-07 NOTE — H&P ADULT - PROBLEM SELECTOR PLAN 5
significant PLEF  no under the impression that this is causing respiratory difficulty   will continue to monitor , no need for ergent drainage no sings of coffee ground emesis or any bleeding here in ER  will start Protonix 40 BID as treatment  consult GI and monitor

## 2020-03-07 NOTE — H&P ADULT - PROBLEM SELECTOR PLAN 1
Initial treatment thought to be targeted towards septic shock from PNA  Not impressed with consolidation on imaging  pulmonary edema is present and the second differential diagnosis's decompensated hear failure is more likley      -30 cc/kg fluid challenge was performed without improvement in blood pressure  -patient was on Levophed but changed to NeoSynephrie for tachycardia will titrate for MAP 65-70  -repeat lactate  -blood/urine/sputum cultures, then initiate broad spectrum antibiotics  -follow cultures and narrow antibitoics as able  -goal UOP >0.5 cc/kg/hr  -procalcitonin Initial treatment thought to be targeted towards septic shock from PNA  Not impressed with consolidation on imaging  pulmonary edema is present and the second differential diagnosis's decompensated hear failure is more likley    -30 cc/kg fluid challenge was performed without improvement in blood pressure  -patient was on Levophed but changed to NeoSynephrie for tachycardia will titrate for MAP 65-70  -repeat lactate but not likely elevated from sepsis rather hear failure form volume overload in setting of renal failure  -blood/urine/sputum cultures, then initiate broad spectrum antibiotics  -follow cultures and narrow antibitoics as able  -goal UOP >0.5 cc/kg/hr  -procalcitonin

## 2020-03-07 NOTE — PROGRESS NOTE ADULT - ASSESSMENT
73yo M admitted 3/6 with initial concern for GI bleeding but admission complicated by:  suspected sepsis due to pneumonia  cardiogenic shock with acute hypoxic respiratory failure due to fluid overload  shock requiring vasopressors  severe lactic metabolic acidosis  coagulopathy - on Eliquis - ?? INR elevated due to liver dz - treated with IV Vitamin K  thrombocytopenia  NSETMI  Cardiomyopathy with EF 20% and severe AS (0.6cm2).  ESRD on HD - s/p dialysis session 3/7  Large B/L pleural effusions on CT - likely result of Cardiomyopathy/decompensated HFrEF      Plan at this time is for contineud care in ICU  HOB 30  GI and DVT   recheck INR in am  serial procalcitonin levels to assess for infection/duration of ABx admin  empiric Rocephin and Zithromax for now  f/u cultures   cardiology eval/input appreciated  nephrology eval/HD appreciated  titrate pressors for MAP 65-70mmHg  -resume pts Midodrine (5mg BID at home)  repeat lactate and VBG 16:30 and in am  GI input appreciated - no acute indication for endoscopy.  Monitor for s/s of bleeding.  Supportive care 73yo M admitted 3/6 with initial concern for GI bleeding but admission complicated by:  suspected sepsis due to pneumonia  cardiogenic shock with acute hypoxic respiratory failure due to fluid overload  shock requiring vasopressors  severe lactic metabolic acidosis  coagulopathy - on Eliquis - ?? INR elevated due to liver dz - treated with IV Vitamin K  thrombocytopenia  NSETMI  Cardiomyopathy with EF 20% and severe AS (0.6cm2).  ESRD on HD - s/p dialysis session 3/7  Large B/L pleural effusions on CT - likely result of Cardiomyopathy/decompensated HFrEF  Nurse concerned for aspiration risk given poor ability to tolerate PO water      Plan at this time is for contineud care in ICU  HOB 30  GI and DVT   Speech swallow eval  recheck INR in am  serial procalcitonin levels to assess for infection/duration of ABx admin  empiric Rocephin and Zithromax for now  f/u cultures   cardiology eval/input appreciated  nephrology eval/HD appreciated  titrate pressors for MAP 65-70mmHg  -resume pts Midodrine (5mg BID at home)  repeat lactate and VBG 16:30 and in am  GI input appreciated - no acute indication for endoscopy.  Monitor for s/s of bleeding.  Supportive care

## 2020-03-07 NOTE — H&P ADULT - PROBLEM SELECTOR PLAN 4
emergent hemodialysis this morning   repeat labs  Hold nephrotoxic meds  Continue conservative hydration  Trend urine output  Follow up BUN/Creatinine  follow up electrolytes will contact nephrology on call for emergent hemodialysis   Hold nephrotoxic meds  hold aggressive  hydration  Trend urine output  Follow up BUN/Creatinine  follow up electrolytes

## 2020-03-07 NOTE — ED ADULT NURSE REASSESSMENT NOTE - NS ED NURSE REASSESS COMMENT FT1
at 0215 patient noticed to be more lethargic and less arousable, not as verbally responsive as previously, hypoxic, tachypneic with accesory muscle use. hypotensive with intermittent bradycardia. call to dr sharma placed, verbal orders given for normal saline 250mL bolus. repeat ekg completed, .dr edith rojas call to eicu to reevaluate patient, plan to send icu PA to patient's room. patient placed on bipap 12/5.

## 2020-03-07 NOTE — H&P ADULT - PROBLEM SELECTOR PLAN 3
-Patient currently on non invasive  vent support  -titrate settings to maintain SaO2 >90%, or pH >7.25  low threshold for intubation   -Peridex oral care and VAP prophylaxis with HOB 30 degrees   -aggressive chest PT and suctioning -Patient currently on non invasive vent support  -titrate settings to maintain SaO2 >90%, or pH >7.25  -repeat ABG and monitor work of breathing   -Peridex oral care and VAP prophylaxis with HOB 30 degrees   -aggressive chest PT and suctioning

## 2020-03-07 NOTE — ED ADULT NURSE REASSESSMENT NOTE - NS ED NURSE REASSESS COMMENT FT1
at 03:57 patient went into vtach with pulse of 145, norepinephrine infusion discontinued at 0358. patient more responsive, laying awake in bed. remained alert and talking throughout episode of vtach. patient back to sinus rhythm at rate of 86 at 04:11. plan to start neosynephrine infusion once compounded from pharmacy. patient to be admitted to icu.

## 2020-03-07 NOTE — CONSULT NOTE ADULT - SUBJECTIVE AND OBJECTIVE BOX
Chief complaints.  Sent to ED from [facility] with coffee ground emesis.    HPI:  75 yo man with ESRD, hx of CAD post Stents (per EMR), and Ileostomy ( done many years ago due to Crohn's disease) sent to ED from [facility] with coffee ground emesis. Pt initially treated for GIB and possible PNA.   Pt's clinical status rapidly deteriorated with worsening hypoxia and hypotension.  Pt admitted to ICU early this am. Now on Bipap mask for oxygenation and on pressor with SBP in 90's.  Unclear hx of ESRD--Appears on HD for several years.  Unclear why and when pt was admitted to [facility].  Pt is on Apixaban.    PMHX and PSHX.  Unable to obtain full hx.  1.ESRD   2.HTN  3.CAD (?stent)  4.Crohn's disease.  Post Ileostomy    FAMILY HISTORY:  No pertinent family history in first degree relatives      SOCIAL HISTORY : NH resident. unable to obtain further.  Allergies    Codeine Phosphate (Nausea)  morphine (Nausea)  prednisoLONE (Nausea)    Home Medications:   * Patient Currently Takes Medications as of 07-Mar-2020 01:03 documented in Structured Notes  · 	Acidophilus oral capsule: Last Dose Taken:  , 2 cap(s) orally 3 times a day  · 	albuterol 2.5 mg/3 mL (0.083%) inhalation solution: Last Dose Taken:  , 3 milliliter(s) inhaled every 4 hours, As Needed - for shortness of breath and/or wheezing  · 	albuterol 2.5 mg/3 mL (0.083%) inhalation solution: Last Dose Taken:  , 3 milliliter(s) inhaled every 6 hours  · 	apixaban 2.5 mg oral tablet: Last Dose Taken:  , 1 tab(s) orally 2 times a day  · 	cholecalciferol 1000 intl units (25 mcg) oral tablet: Last Dose Taken:  , 1 tab(s) orally once a day  · 	cyanocobalamin 1000 mcg oral tablet: Last Dose Taken:  , 1 tab(s) orally once a day  · 	folic acid 1 mg oral tablet: Last Dose Taken:  , 1 tab(s) orally once a day  · 	ipratropium CFC free 17 mcg/inh inhalation aerosol: 2 puff(s) inhaled 4 times a day  · 	ipratropium CFC free 17 mcg/inh inhalation aerosol: 2 puff(s) inhaled every 6 hours, As Needed - for shortness of breath and/or wheezing  · 	midodrine 5 mg oral tablet: 1 tab(s) orally 2 times a day, As Needed - for hypotension  · 	Multiple Vitamins oral tablet: 1 tab(s) orally once a day  · 	QUEtiapine 25 mg oral tablet: 0.5 tab(s) orally 3 times a day, As Needed - for anxiety  · 	sevelamer hydrochloride 800 mg oral tablet: 2 tab(s) orally 3 times a day  · 	thiamine 100 mg oral tablet: 1 tab(s) orally once a day  · 	acetaminophen 325 mg oral tablet: Last Dose Taken:  , 2 tab(s) orally every 6 hours, As Needed - for fever, for moderate pain  · 	ascorbic acid 500 mg oral tablet: Last Dose Taken:  , 1 tab(s) orally once a day  · 	zinc sulfate 220 mg oral tablet: 1 tab(s) orally once a day      MEDICATIONS  (STANDING):  albumin human 25% IVPB 50 milliLiter(s) IV Intermittent <User Schedule>  pantoprazole  Injectable 40 milliGRAM(s) IV Push every 12 hours  phenylephrine    Infusion 0.5 MICROgram(s)/kG/Min (3.188 mL/Hr) IV Continuous <Continuous>  phytonadione  IVPB 10 milliGRAM(s) IV Intermittent once    MEDICATIONS  (PRN):  ondansetron Injectable 4 milliGRAM(s) IV Push once PRN Nausea and/or Vomiting    Vital Signs Last 24 Hrs  T(C): 36.6 (07 Mar 2020 09:25), Max: 36.8 (06 Mar 2020 18:23)  T(F): 97.8 (07 Mar 2020 09:25), Max: 98.2 (06 Mar 2020 18:23)  HR: 81 (07 Mar 2020 10:23) (67 - 97)  BP: 100/51 (07 Mar 2020 10:23) (83/41 - 174/133)  BP(mean): 60 (07 Mar 2020 10:23) (54 - 144)  RR: 14 (07 Mar 2020 10:23) (11 - 24)  SpO2: 79% (07 Mar 2020 10:23) (77% - 100%)  Daily Height in cm: 170.18 (06 Mar 2020 18:23)    Daily Weight in k.7 (07 Mar 2020 05:30)  I&O's Summary    06 Mar 2020 07:01  -  07 Mar 2020 07:00  --------------------------------------------------------  IN: 8.7 mL / OUT: 0 mL / NET: 8.7 mL    PHYSICAL EXAM:  Lethargic with Bipap mask in place.  GEN: Arousable. confused  HEENT: WNL  NECK ; supple  CV: S1S2 RRR  LUNGS: bilateral scattered rhonchi  ABD: soft, ileostomy in place with loose stool  EXT: no edema    LABS:                        13.0   13.83 )-----------( 70       ( 07 Mar 2020 06:42 )             40.5     03-07    143  |  102  |  56<H>  ----------------------------<  97  5.6<H>   |  26  |  5.67<H>    Ca    9.5      07 Mar 2020 06:42  Mg     2.5     -    TPro  5.6<L>  /  Alb  2.6<L>  /  TBili  2.0<H>  /  DBili  x   /  AST  211<H>  /  ALT  251<H>  /  AlkPhos  108  03-07    PT/INR - ( 07 Mar 2020 06:42 )   PT: 45.7 sec;   INR: 3.95 ratio         PTT - ( 06 Mar 2020 19:54 )  PTT:35.0 sec    Magnesium, Serum: 2.5 mg/dL ( @ 06:42)    ABG - ( 07 Mar 2020 06:48 )  pH, Arterial: 7.44  pH, Blood: x     /  pCO2: 38    /  pO2: 394   / HCO3: 26    / Base Excess: 2.2   /  SaO2: 100

## 2020-03-07 NOTE — ED ADULT NURSE REASSESSMENT NOTE - NS ED NURSE REASSESS COMMENT FT1
report given to icu rn. patient transported to icu on cardiac monitor, bipap, neosynephrine infusing. patient alert, verbal, and responding to commands. transported with rn, respiratory therapist, and transporter.

## 2020-03-07 NOTE — H&P ADULT - NSHPLABSRESULTS_GEN_ALL_CORE
ICU Vital Signs Last 24 Hrs  T(C): 36.6 (07 Mar 2020 00:16), Max: 36.8 (06 Mar 2020 18:23)  T(F): 97.8 (07 Mar 2020 00:16), Max: 98.2 (06 Mar 2020 18:23)  HR: 80 (07 Mar 2020 03:47) (67 - 97)  BP: 103/85 (07 Mar 2020 03:47) (85/34 - 111/22)  BP(mean): 73 (07 Mar 2020 00:16) (73 - 73)  ABP: --  ABP(mean): --  RR: 18 (07 Mar 2020 03:47) (18 - 24)  SpO2: 100% (07 Mar 2020 03:47) (96% - 100%)    03-06    143  |  103  |  48<H>  ----------------------------<  94  5.0   |  27  |  5.37<H>    Ca    8.8      06 Mar 2020 19:54    TPro  4.7<L>  /  Alb  2.2<L>  /  TBili  1.7<H>  /  DBili  x   /  AST  214<H>  /  ALT  209<H>  /  AlkPhos  96  03-06                        11.4   15.22 )-----------( 72       ( 07 Mar 2020 01:14 )             35.3     PT/INR - ( 06 Mar 2020 19:54 )   PT: 60.3 sec;   INR: 5.16 ratio     PTT - ( 06 Mar 2020 19:54 )  PTT:35.0 sec    LIVER FUNCTIONS - ( 06 Mar 2020 19:54 )  Alb: 2.2 g/dL / Pro: 4.7 gm/dL / ALK PHOS: 96 U/L / ALT: 209 U/L / AST: 214 U/L / GGT: x             CAPILLARY BLOOD GLUCOSE  POCT Blood Glucose.: 139 mg/dL (07 Mar 2020 02:43)

## 2020-03-07 NOTE — DIETITIAN INITIAL EVALUATION ADULT. - PROBLEM SELECTOR PLAN 2
continue BIPAP with low threshold for intubation   repeat ABG  -conservative fluids   -hold diuresis given hypotension   -trend PBnp and troponin  - obtain echocardiogram

## 2020-03-07 NOTE — CHART NOTE - NSCHARTNOTEFT_GEN_A_CORE
Reason for eICU consult: Shock and hypoxic respiratory failure.    74 year old male with ESRD on HD was BIBEMS from [facility] for evaluation of +hematemesis beginning today. Patient was diagnosed as pneumonia. IVF was given along with ceftriaxone and azithromycin. Patient developed hypoxia and hypotension. CXR and CT chest are suggestive of b/l pleural effusion and b/l pulmonary edema. Patient's BP was 68/38 mm of Hg.    A/P  - Acute hypoxic respiratory failure  - Cardiogenic pulmonary edema  - B/l Pleural effusion  - ? Sepsis (aspiration vs UTI)    Plan:  - BIPAP for respiratory failure  - Plan for HD for pulmonary edema and b/l pleural effusion  - IV protoxin bid for ? GI bleed  - H/H follow up (no active bleeding at present, h/h stable from 2017)  - Urine culture and blood culture. Zosyn for now. D/c if no source identified  - Central line was placed. Phenylephrine was started for shock.   - Admit to ICU  - Discussed with ICU provider

## 2020-03-07 NOTE — H&P ADULT - NSHPPHYSICALEXAM_GEN_ALL_CORE
Physicial Exam:     Constitutional: NAD, well-groomed, well-developed  HEENT: PERRLA, EOMI, no drainage or redness, airway patent   Neck: No bruits; no thyromegaly or nodules,  No JVD  Back: Normal spine flexure, No CVA tenderness, No deformity or limitation of movement  Respiratory: Breath Sounds equal & clear to percussion & auscultation, no accessory muscle use  Cardiovascular: Regular rate & rhythm, normal S1, S2; no murmurs, gallops or rubs; no S3, S4  Gastrointestinal: Soft, non-tender, non distended no hepatosplenomegaly, normal bowel sounds  Extremities: No peripheral edema, No cyanosis, clubbing   Vascular: Equal and normal pulses: 2+ peripheral pulses throughout  Neurological: Alert and oriented to person,place and time, CN2-12 intact, PERRLA, EOMI,  Motor Strength 5/5 B/L upper and lower extremities; DTRs 2+ intact and symmetric, normal sensory exam  Psychiatric: Normal mood, normal affect  Musculoskeletal: No joint pain, swelling or deformity; no limitation of movement  Skin: No rashes, Warm and well perfused HEENT: PERRLA, EOMI, no drainage or redness, airway patent   Neck: No bruits; no thyromegaly or nodules,  No JVD  Back: Normal spine flexure, No CVA tenderness, No deformity or limitation of movement  Respiratory: Breath Sounds equal & clear to percussion & auscultation, no accessory muscle use  Cardiovascular: Regular rate & rhythm, normal S1, S2; no murmurs, gallops or rubs; no S3, S4  Gastrointestinal: Soft, non-tender, non distended no hepatosplenomegaly, normal bowel sounds  Extremities: No peripheral edema, No cyanosis, clubbing   Vascular: Equal and normal pulses: 2+ peripheral pulses throughout  Neurological: Alert and oriented to person,place and time, CN2-12 intact, PERRLA, EOMI,  Motor Strength 5/5 B/L upper and lower extremities; DTRs 2+ intact and symmetric, normal sensory exam  Psychiatric: Normal mood, normal affect  Musculoskeletal: No joint pain, swelling or deformity; no limitation of movement  Skin: No rashes, Warm and well perfused

## 2020-03-07 NOTE — DIETITIAN INITIAL EVALUATION ADULT. - PROBLEM SELECTOR PLAN 1
Initial treatment thought to be targeted towards septic shock from PNA  Not impressed with consolidation on imaging  pulmonary edema is present and the second differential diagnosis's decompensated hear failure is more likley    -30 cc/kg fluid challenge was performed without improvement in blood pressure  -patient was on Levophed but changed to NeoSynephrie for tachycardia will titrate for MAP 65-70  -repeat lactate but not likely elevated from sepsis rather hear failure form volume overload in setting of renal failure  -blood/urine/sputum cultures, then initiate broad spectrum antibiotics  -follow cultures and narrow antibitoics as able  -goal UOP >0.5 cc/kg/hr  -procalcitonin

## 2020-03-07 NOTE — CONSULT NOTE ADULT - SUBJECTIVE AND OBJECTIVE BOX
PCP:    REQUESTING PHYSICIAN:    REASON FOR CONSULT:    CHIEF COMPLAINT:    HPI:  74 year old male who presented to ER from a local skilled nursing facility with coffee ground emesis He was admitted for GI Bleeding workup, found to have fever and questionable chest xray for pneumonia and  was diagnosed as pneumonia. Antibiotics were started ceftriaxone and azithromycin., When he developed hypoxia and hypotension rapid response was called and EICU evaluated the patient recommended no transfer to ICU starting the sepsis protocol and give fluid and reconsult if hypotension persists.     A second rapid was called a few hrs later  and found him slightly lethargic , on BIPAP and extremely hypotensive with systolic Bp in the 50's. He had increased work of breathing with accessory muscle use and audib He was placed on levophed which ended up causing severe tachycardia which was stopped , and changed to Oh-Synephrine CXR and CT chest are suggestive of b/l pleural effusion and b/l pulmonary edema   volume overload and bilateral pleura effusion .Note patient is ESRD on HD ( tue,thr,sat). Cardiology called after Echo revealed low EF and AS. Pt is awake but unable to vocalize.     PAST MEDICAL & SURGICAL HISTORY:  Anemia  Hypertension  Renal failure  No significant past surgical history      Allergies    Codeine Phosphate (Nausea)  morphine (Nausea)  prednisoLONE (Nausea)    Intolerances        SOCIAL HISTORY:    FAMILY HISTORY:  No pertinent family history in first degree relatives      MEDICATIONS:  MEDICATIONS  (STANDING):  albumin human 25% IVPB 50 milliLiter(s) IV Intermittent <User Schedule>  azithromycin  IVPB 500 milliGRAM(s) IV Intermittent every 24 hours  cefTRIAXone Injectable. 1000 milliGRAM(s) IV Push every 24 hours  pantoprazole  Injectable 40 milliGRAM(s) IV Push every 12 hours  phenylephrine    Infusion 0.5 MICROgram(s)/kG/Min (3.188 mL/Hr) IV Continuous <Continuous>    MEDICATIONS  (PRN):  ondansetron Injectable 4 milliGRAM(s) IV Push once PRN Nausea and/or Vomiting      REVIEW OF SYSTEMS:    CONSTITUTIONAL:Weakness, unable to elaborate ROS    Vital Signs Last 24 Hrs  T(C): 36.6 (07 Mar 2020 16:00), Max: 36.8 (06 Mar 2020 18:23)  T(F): 97.8 (07 Mar 2020 16:00), Max: 98.2 (06 Mar 2020 18:23)  HR: 82 (07 Mar 2020 16:00) (67 - 97)  BP: 90/74 (07 Mar 2020 16:00) (72/33 - 174/133)  BP(mean): 78 (07 Mar 2020 16:00) (50 - 144)  RR: 16 (07 Mar 2020 16:00) (11 - 24)  SpO2: 99% (07 Mar 2020 15:30) (77% - 100%)    I&O's Summary    06 Mar 2020 07:01  -  07 Mar 2020 07:00  --------------------------------------------------------  IN: 8.7 mL / OUT: 0 mL / NET: 8.7 mL        PHYSICAL EXAM:    Constitutional: NAD, lethargic  HEENT: PERR, EOMI,  No oral cyananosis.  Neck:  supple,  No JVD  Respiratory: Breath sounds are reduced  Cardiovascular: S1 and S2, regular rate and rhythm, no Murmurs, gallops or rubs  Gastrointestinal: Bowel Sounds present, soft, nontender.   Extremities: No peripheral edema. No clubbing or cyanosis.  Vascular: 1+ peripheral pulses  Neurological: A/O x 3, no focal deficits  Musculoskeletal: no calf tenderness.  Skin: No rashes.      LABS: All Labs Reviewed:                        13.6   15.76 )-----------( 84       ( 07 Mar 2020 13:00 )             41.2                         13.0   13.83 )-----------( 70       ( 07 Mar 2020 06:42 )             40.5                         11.4   15.22 )-----------( 72       ( 07 Mar 2020 01:14 )             35.3     07 Mar 2020 06:42    143    |  102    |  56     ----------------------------<  97     5.6     |  26     |  5.67   06 Mar 2020 19:54    143    |  103    |  48     ----------------------------<  94     5.0     |  27     |  5.37     Ca    9.5        07 Mar 2020 06:42  Ca    8.8        06 Mar 2020 19:54  Mg     2.5       07 Mar 2020 06:42    TPro  5.6    /  Alb  2.6    /  TBili  2.0    /  DBili  x      /  AST  211    /  ALT  251    /  AlkPhos  108    07 Mar 2020 06:42  TPro  4.7    /  Alb  2.2    /  TBili  1.7    /  DBili  x      /  AST  214    /  ALT  209    /  AlkPhos  96     06 Mar 2020 19:54    PT/INR - ( 07 Mar 2020 06:42 )   PT: 45.7 sec;   INR: 3.95 ratio         PTT - ( 06 Mar 2020 19:54 )  PTT:35.0 sec  CARDIAC MARKERS ( 07 Mar 2020 06:42 )  0.347 ng/mL / x     / x     / x     / x      CARDIAC MARKERS ( 06 Mar 2020 19:54 )  0.270 ng/mL / x     / x     / x     / x          Blood Culture:   03-06 @ 19:54  Pro Bnp [id number]        RADIOLOGY/EKG:< from: 12 Lead ECG (03.07.20 @ 03:54) >    Diagnosis Line Sinus tachycardia with short PA  Left axis deviation  Right bundle branch block  Abnormal ECG  When compared with ECG of 07-MAR-2020 < from: OBSERVATION (03.07.20 @ 12:23) >      < end of copied text >  02:37,  Previous ECG has undetermined rhythm, needs review  Right bundle branch block is now Present  Confirmed by OSIRIS ROWLAND MDNETH ([id number]) on 3/7/2020 7:23:20 AM    < end of copied text >    < from: TTE Echo Doppler w/o Cont (09.08.16 @ 13:32) >  OBSERVATIONS:  Technically difficult study  Mitral Valve: Calcified mitral annulus with normally opening mitral valve   leaflets. Mild mitral regurgitation.  Aortic Valve/Aorta: Calcified trileaflet aortic valve with normal   opening. Moderate to severe aortic insufficiency  Tricuspid Valve: Normal tricuspid valve. Trace tricuspid regurgitation.  Pulmonic Valve: The pulmonic valve is not well visualized. Probably   normal.  Left Atrium: Mild to moderate enlargement  Right Atrium: Normal  Left Ventricle: Mild concentric hypertrophy. Overall preserved left   ventricular function. The EF is approximately 60-65%.  Right Ventricle: Normal right ventricular size and function.  Pericardium/Pleura: No pericardial effusion noted.  Pulmonary/RV Pressure: Insufficient tricuspid regurgitation Doppler in   order to estimate the right ventricular systolic pressure.  LV Diastolic Function: E to a reversal is present consistent with   decreased left ventricular compliance.                   VIDYA WAY M.D., ATTENDING CARDIOLOGIST  This document has been electronically signed. Sep  9 2016  6:15P          < end of copied text >

## 2020-03-07 NOTE — H&P ADULT - HISTORY OF PRESENT ILLNESS
74 year old male 74 year old male who presented to ER from a local skilled nursing facility with coffee ground emesis He was admitted for GI Bleeding workup, found to have fever and questionable chest xray for pneumonia and  was diagnosed as pneumonia. Antibiotics were started ceftriaxone and azithromycin., When he developed hypoxia and hypotension rapid response was called and EICU evaluated the patient recommended no transfer to ICU starting the sepsis protocol and give fluid and reconsult if hypotension persists.     A second rapid was called a few hrs later which i responded to and found him slightly lethargic , on BIPAP and extremely hypotensive with systolic Bp in the 50's. He had increased work of breathing with accessory muscle use and audib He was placed on levophed which ended up causing severe tachycardia which was stopped , and changed to Oh-Synephrine with hopes to cause lle crackles in definte fluid overlad ess tachycardia.  CXR and CT chest are suggestive of b/l pleural effusion and b/l pulmonary edema, and by my opinion not impressive for PNA. I feel this is more cardiogenic shock with volume overload and bilateral pleura effusion.    Note patient is ESRD on HD ( tue,thr,sat) unknown if he makes urine . Will be conservative with fluids and plan to contact nephrology for stat hemolysis 74 year old male who presented to ER from a local skilled nursing facility with coffee ground emesis He was admitted for GI Bleeding workup, found to have fever and questionable chest xray for pneumonia and  was diagnosed as pneumonia. Antibiotics were started ceftriaxone and azithromycin., When he developed hypoxia and hypotension rapid response was called and EICU evaluated the patient recommended no transfer to ICU starting the sepsis protocol and give fluid and reconsult if hypotension persists.     A second rapid was called a few hrs later which i responded to and found him slightly lethargic , on BIPAP and extremely hypotensive with systolic Bp in the 50's. He had increased work of breathing with accessory muscle use and audib He was placed on levophed which ended up causing severe tachycardia which was stopped , and changed to Oh-Synephrine with hopes to cause less tachy effects.  CXR and CT chest are suggestive of b/l pleural effusion and b/l pulmonary edema( ascultation of lungs shows bilateral crackles in all lung fields)  and by my opinion not impressive for PNA. I feel this is more cardiogenic shock with volume overload and bilateral pleura effusion.Note patient is ESRD on HD ( tue,thr,sat) unknown if he makes urine . Will be conservative with fluids and plan to contact nephrology for stat hemolysis.

## 2020-03-07 NOTE — PATIENT PROFILE ADULT - BRADEN SCORE
Patient will come to [facility] office to : prescription.   Patient was advised of location and hours: Yes.   Patient was advised to bring photo identification: Yes.   Patient elects another party to  item: yes, name: Emily, .   14

## 2020-03-07 NOTE — DIETITIAN INITIAL EVALUATION ADULT. - PERTINENT LABORATORY DATA
03-07 Na143 mmol/L Glu 97 mg/dL K+ 5.6 mmol/L<H> Cr  5.67 mg/dL<H> BUN 56 mg/dL<H> Phos n/a   Alb 2.6 g/dL<L> PAB n/a

## 2020-03-07 NOTE — DIETITIAN INITIAL EVALUATION ADULT. - OTHER INFO
74 year old male who presented to ER from a local skilled nursing facility with coffee ground emesis He was admitted for GI Bleeding workup, found to have fever and questionable chest xray for pneumonia and  was diagnosed as pneumonia. Antibiotics were started ceftriaxone and azithromycin., When he developed hypoxia and hypotension rapid response was called and EICU evaluated the patient.    Pt seen by RD for PI stage II. Pt on dialysis. Pt confused and unable to provide detailed diet information. of note, pt's weight in chart may be inaccurate. Reviewed bed scale weight and pt appears to be about 57kg (pt's UBW from 2017). Pt was at [place] facility and likely monitored by RD, Will try and contact to full assess patients nutritional status. Pt's diet order was no salt added with nepro TID at [place]. Recommend checking pt's weight after dialysis to assess if pt has lost weight from UBW. Pt was seen by RD's in the past and pt had a fair appetite with little to no weight changes (appears the same),  Pt currently NPO, if diet will not be advanced within 24-48 hours may benefit from artifical nutrition. (pt currently on bipap). NFPE held at this time because pt was receiving dialysis, but pt does have visual signs of wasting recommend doing a follow up. Will continue to monitor pt's nutritional status.

## 2020-03-07 NOTE — DIETITIAN INITIAL EVALUATION ADULT. - PROBLEM SELECTOR PLAN 4
will contact nephrology on call for emergent hemodialysis   Hold nephrotoxic meds  hold aggressive  hydration  Trend urine output  Follow up BUN/Creatinine  follow up electrolytes

## 2020-03-07 NOTE — DIETITIAN INITIAL EVALUATION ADULT. - PROBLEM SELECTOR PLAN 6
signifcant bilateral PLEF not responsible for repoiraory failure  no need for ermegnet drainage   likely secondary to heart failure  will monitor

## 2020-03-07 NOTE — CONSULT NOTE ADULT - ASSESSMENT
74 year old male admitted with coffee ground emeissi ( which he had at the facility but none here in this ER) thouhgt to have PNA , found to hav e increased LFT and INR. Her devleoped acute hypoxic respiratoy faioure which is likely secondaerry to heart fialure and volume overload. He was placed on BIPAP but systolic hypotension persisted and eventully requiried IV pressors for liekly decompensated heart failure in the setting of fluids and ESRD.     Hypotensive shock, possibly pneumonia.    Cardiomyopathy with low ejection fraction noted.    GI bleed with hematemesis which has not recurred.  Would continue Protonix.  Patient is at high risk of decompensation with endoscopic intervention at this time secondary to stability from GI perspective, would hold off at this time on endoscopy.    Acute respiratory failure with hypoxemia also noted.  He will be followed for this.  Case discussed with intensivist and nurse.

## 2020-03-07 NOTE — H&P ADULT - PROBLEM SELECTOR PLAN 2
Fluid restreict   Pt is definelty fluid overloaded   will call nephrology to arrange emergent dialysis continue BIPAP with low threshold for intubation   repeat ABG  -conservative fluids   -hold diuresis given hypotension   -trend PBnp and troponin  - obtain echocardiogram

## 2020-03-07 NOTE — DIETITIAN INITIAL EVALUATION ADULT. - PROBLEM SELECTOR PLAN 5
no sings of coffee ground emesis or any bleeding here in ER  will start Protonix 40 BID as treatment  consult GI and monitor

## 2020-03-07 NOTE — H&P ADULT - ASSESSMENT
74 year old male 74 year old male admitted with coffee ground emeissi ( which he had at the facility but none here in this ER) thouhgt to have PNA , found to hav e increased LFT and INR. Her devleoped acute hypoxic respiratoy faioure which is likely secondaerry to heart fialure and volume overload. He was placed on BIPAP but systolic hypotension persisted and eventully requiried IV pressors for liekly decompensated heart failure in the setting of fluids and ESRD.     Critical Care time: 35 mins assessing presenting problems of acute illness that poses high probability of life threatening deterioration or end organ damage/dysfunction.  Medical decision making inculding Initiating plan of care, reviewing data, reviewing radiology,direct patient bedside evaluation and interpretation of vital signs, any necessary ventilator management , discusion with multidisciplinary team, discussing goals of care with patient/family, all non inclusive of procedures

## 2020-03-07 NOTE — CONSULT NOTE ADULT - SUBJECTIVE AND OBJECTIVE BOX
Patient is a 74y old  Male who presents with a chief complaint of coffee ground emesis (07 Mar 2020 10:25)      HPI:  74 year old male who presented to ER from a local skilled nursing facility with coffee ground emesis He was admitted for GI Bleeding workup, found to have fever and questionable chest xray for pneumonia and  was diagnosed as pneumonia. Antibiotics were started ceftriaxone and azithromycin., When he developed hypoxia and hypotension rapid response was called and EICU evaluated the patient recommended no transfer to ICU starting the sepsis protocol and give fluid and reconsult if hypotension persists.     A second rapid was called a few hrs later which i responded to and found him slightly lethargic , on BIPAP and extremely hypotensive with systolic Bp in the 50's. He had increased work of breathing with accessory muscle use and audib He was placed on levophed which ended up causing severe tachycardia which was stopped , and changed to Oh-Synephrine with hopes to cause less tachy effects.  CXR and CT chest are suggestive of b/l pleural effusion and b/l pulmonary edema( ascultation of lungs shows bilateral crackles in all lung fields)  and by my opinion not impressive for PNA. I feel this is more cardiogenic shock with volume overload and bilateral pleura effusion.Note patient is ESRD on HD ( tue,thr,sat) unknown if he makes urine . Will be conservative with fluids and plan to contact nephrology for stat hemolysis. (07 Mar 2020 04:49)    Patient is pleasant but difficult to comprehend.  When he vocalizes, he can barely get any voice out and most of this is done by debriding.  He has a colostomy but does not know why.  He had an endoscopy at hospital years ago and was found to have peptic ulcer disease.  His colostomy output at this point is brown and green without any evidence of blood within it  He is status post hemodialysis and is now stable.  Blood pressure is still little bit low   He denies any use of anti-inflammatories      PAST MEDICAL & SURGICAL HISTORY:  Anemia  Hypertension  Renal failure  No significant past surgical history      MEDICATIONS  (STANDING):  albumin human 25% IVPB 50 milliLiter(s) IV Intermittent <User Schedule>  albuterol/ipratropium for Nebulization 3 milliLiter(s) Nebulizer every 6 hours  azithromycin  IVPB 500 milliGRAM(s) IV Intermittent every 24 hours  cefTRIAXone Injectable. 1000 milliGRAM(s) IV Push every 24 hours  pantoprazole  Injectable 40 milliGRAM(s) IV Push every 12 hours  phenylephrine    Infusion 0.5 MICROgram(s)/kG/Min (3.188 mL/Hr) IV Continuous <Continuous>  sevelamer carbonate 1600 milliGRAM(s) Oral two times a day with meals    MEDICATIONS  (PRN):  midodrine 5 milliGRAM(s) Oral every 8 hours PRN hypotension  ondansetron Injectable 4 milliGRAM(s) IV Push once PRN Nausea and/or Vomiting      Allergies    Codeine Phosphate (Nausea)  morphine (Nausea)  prednisoLONE (Nausea)    Intolerances        SOCIAL HISTORY:neg drugs    FAMILY HISTORY:  No pertinent family history in first degree relatives      REVIEW OF SYSTEMS:    cant obtain    Vital Signs Last 24 Hrs  T(C): 36.6 (07 Mar 2020 16:00), Max: 36.8 (06 Mar 2020 18:23)  T(F): 97.8 (07 Mar 2020 16:00), Max: 98.2 (06 Mar 2020 18:23)  HR: 82 (07 Mar 2020 16:00) (67 - 97)  BP: 90/74 (07 Mar 2020 16:00) (72/33 - 174/133)  BP(mean): 78 (07 Mar 2020 16:00) (50 - 144)  RR: 16 (07 Mar 2020 16:00) (11 - 24)  SpO2: 99% (07 Mar 2020 15:30) (77% - 100%)    PHYSICAL EXAM:    Constitutional: NAD, well-developed  HEENT: EOMI, throat clear  Neck: No LAD, supple  Respiratory: CTA and P  Cardiovascular: S1 and S2, RRR, no M  Gastrointestinal: BS+, soft, NT/ND, neg HSM,ostomy viable  Extremities: No peripheral edema, neg clubing, cyanosis  Vascular: 2+ peripheral pulses  Neurological: A/O x 3, no focal deficits  Psychiatric: Normal mood, normal affect  Skin: No rashes    LABS:  CBC Full  -  ( 07 Mar 2020 13:00 )  WBC Count : 15.76 K/uL  RBC Count : 3.95 M/uL  Hemoglobin : 13.6 g/dL  Hematocrit : 41.2 %  Platelet Count - Automated : 84 K/uL  Mean Cell Volume : 104.3 fl  Mean Cell Hemoglobin : 34.4 pg  Mean Cell Hemoglobin Concentration : 33.0 gm/dL  Auto Neutrophil # : x  Auto Lymphocyte # : x  Auto Monocyte # : x  Auto Eosinophil # : x  Auto Basophil # : x  Auto Neutrophil % : x  Auto Lymphocyte % : x  Auto Monocyte % : x  Auto Eosinophil % : x  Auto Basophil % : x    03-07    143  |  102  |  56<H>  ----------------------------<  97  5.6<H>   |  26  |  5.67<H>    Ca    9.5      07 Mar 2020 06:42  Mg     2.5     03-07    TPro  5.6<L>  /  Alb  2.6<L>  /  TBili  2.0<H>  /  DBili  x   /  AST  211<H>  /  ALT  251<H>  /  AlkPhos  108  03-07    PT/INR - ( 07 Mar 2020 06:42 )   PT: 45.7 sec;   INR: 3.95 ratio         PTT - ( 06 Mar 2020 19:54 )  PTT:35.0 sec        RADIOLOGY & ADDITIONAL STUDIES:

## 2020-03-07 NOTE — PROGRESS NOTE ADULT - SUBJECTIVE AND OBJECTIVE BOX
73 yo M admitted to hospital medicine service 3/6 due to coffee ground emesis - coincidently found to have fever and questionable chest x-ray for pneumonia and  was diagnosed as pneumonia. Antibiotics were started ceftriaxone and azithromycin., While waiting for evaluation in ED, pt developed hypoxia and hypotension at which time a rapid response was called and eICU evaluated the patient.  Multiple issues identified but no need for ICU evaluation at that time.    A second rapid was called a few hrs later while pt still waiting for eval in     [facility] evaluated the patient at that time - slightly lethargic , on BIPAP and extremely hypotensive with systolic Bp in the 50's (+) increased work of breathing with accessory muscle use - placed on levophed which induced tachycardia and was therefore changed to Oh-Synephrine.  CXR and CT chest are both thought to demonstrate B/L pleural effusions and B/L pulmonary edema - likely suffering from cardiogenic shock with volume overload.    Note patient is ESRD on HD ( tue,thr,sat) unknown if he makes urine .     Pt transferred to ICU and plan made for HD in AM    above d/w AJAY Rodriguez who initially evaluated and treated the patient.    3/7: seen and examined in AM and then again multiple times during HD and post-HD.  Initially pt on NIPPV - Sat 100% and pt uncomfortable and so FiO2 titrated down/pt tried OFF NIPPV - on f/u doing well with NC O2.  HD initiated and pt tolerated with some titration of vasopressors.  AA and interactive but poor historian.  Repeat Hgb reasonable.  No significant evidence of bleeding  Discussed with Dr Kebede and Dr Nelson.      Notably ECHO done - unofficial EF 20% with suspected severe AS valve area 0.6cm2.  D/w Dr. Garcia.      PAST MEDICAL & SURGICAL HISTORY:  Anemia  Hypertension  Renal failure  No significant past surgical history      Allergies    Codeine Phosphate (Nausea)  morphine (Nausea)  prednisoLONE (Nausea)    Height (cm): 170.18 (03-06 @ 18:23)  Weight (kg): 34 (03-06 @ 18:23)  BMI (kg/m2): 11.7 (03-06 @ 18:23)    ICU Vital Signs Last 24 Hrs  T(C): 36.4 (07 Mar 2020 13:32), Max: 36.8 (06 Mar 2020 18:23)  T(F): 97.6 (07 Mar 2020 13:32), Max: 98.2 (06 Mar 2020 18:23)  HR: 73 (07 Mar 2020 14:30) (67 - 97)  BP: 92/41 (07 Mar 2020 14:30) (72/33 - 174/133)  BP(mean): 52 (07 Mar 2020 14:30) (50 - 144)  RR: 12 (07 Mar 2020 14:30) (11 - 24)  SpO2: 78% (07 Mar 2020 12:44) (77% - 100%)          I&O's Summary    06 Mar 2020 07:01  -  07 Mar 2020 07:00  --------------------------------------------------------  IN: 8.7 mL / OUT: 0 mL / NET: 8.7 mL                              13.6   15.76 )-----------( 84       ( 07 Mar 2020 13:00 )             41.2       03-07    143  |  102  |  56<H>  ----------------------------<  97  5.6<H>   |  26  |  5.67<H>    Ca    9.5      07 Mar 2020 06:42  Mg     2.5     03-07    TPro  5.6<L>  /  Alb  2.6<L>  /  TBili  2.0<H>  /  DBili  x   /  AST  211<H>  /  ALT  251<H>  /  AlkPhos  108  03-07      CAPILLARY BLOOD GLUCOSE      POCT Blood Glucose.: 139 mg/dL (07 Mar 2020 02:43)      LIVER FUNCTIONS - ( 07 Mar 2020 06:42 )  Alb: 2.6 g/dL / Pro: 5.6 gm/dL / ALK PHOS: 108 U/L / ALT: 251 U/L / AST: 211 U/L / GGT: x             CARDIAC MARKERS ( 07 Mar 2020 06:42 )  0.347 ng/mL / x     / x     / x     / x      CARDIAC MARKERS ( 06 Mar 2020 19:54 )  0.270 ng/mL / x     / x     / x     / x            PT/INR - ( 07 Mar 2020 06:42 )   PT: 45.7 sec;   INR: 3.95 ratio         PTT - ( 06 Mar 2020 19:54 )  PTT:35.0 sec    ABG - ( 07 Mar 2020 06:48 )  pH, Arterial: 7.44  pH, Blood: x     /  pCO2: 38    /  pO2: 394   / HCO3: 26    / Base Excess: 2.2   /  SaO2: 100                     MEDICATIONS  (STANDING):  albumin human 25% IVPB 50 milliLiter(s) IV Intermittent <User Schedule>  pantoprazole  Injectable 40 milliGRAM(s) IV Push every 12 hours  phenylephrine    Infusion 0.5 MICROgram(s)/kG/Min (3.188 mL/Hr) IV Continuous <Continuous>    MEDICATIONS  (PRN):  ondansetron Injectable 4 milliGRAM(s) IV Push once PRN Nausea and/or Vomiting          DVT Prophylaxis: Chemopotphylaxis contraindicated due to GI bleeding, venodynes    Advanced Directives: FULL  Discussed with:  TYLER RAND on chart.    Visit Information: 45 minutes of Critical Care time spent providing medical care for patient's acute illness/conditions that impairs at least one vital organ system and/or poses a high risk of imminent or life threatening deterioration in the patient's condition.  It includes time spent reviewing labs, radiology, discussing goals of care with patient and/or family, and discussing the case with a multidisciplinary team in an effort to prevent further life threatening deterioration or end organ damage.  This time is independent of any procedures performed.    ** Time is exclusive of billed procedures and/or teaching and/or routine family updates.

## 2020-03-08 DIAGNOSIS — I42.9 CARDIOMYOPATHY, UNSPECIFIED: ICD-10-CM

## 2020-03-08 LAB
ALBUMIN SERPL ELPH-MCNC: 3 G/DL — LOW (ref 3.3–5)
ALP SERPL-CCNC: 93 U/L — SIGNIFICANT CHANGE UP (ref 40–120)
ALT FLD-CCNC: 207 U/L — HIGH (ref 12–78)
ANION GAP SERPL CALC-SCNC: 13 MMOL/L — SIGNIFICANT CHANGE UP (ref 5–17)
AST SERPL-CCNC: 149 U/L — HIGH (ref 15–37)
BASE EXCESS BLDV CALC-SCNC: 0.9 MMOL/L — SIGNIFICANT CHANGE UP (ref -2–2)
BASOPHILS # BLD AUTO: 0.01 K/UL — SIGNIFICANT CHANGE UP (ref 0–0.2)
BASOPHILS NFR BLD AUTO: 0.1 % — SIGNIFICANT CHANGE UP (ref 0–2)
BILIRUB SERPL-MCNC: 2.3 MG/DL — HIGH (ref 0.2–1.2)
BUN SERPL-MCNC: 35 MG/DL — HIGH (ref 7–23)
CALCIUM SERPL-MCNC: 9.4 MG/DL — SIGNIFICANT CHANGE UP (ref 8.5–10.1)
CALCIUM SERPL-MCNC: 9.7 MG/DL — SIGNIFICANT CHANGE UP (ref 8.4–10.5)
CHLORIDE SERPL-SCNC: 105 MMOL/L — SIGNIFICANT CHANGE UP (ref 96–108)
CO2 SERPL-SCNC: 25 MMOL/L — SIGNIFICANT CHANGE UP (ref 22–31)
CREAT SERPL-MCNC: 4.07 MG/DL — HIGH (ref 0.5–1.3)
EOSINOPHIL # BLD AUTO: 0.01 K/UL — SIGNIFICANT CHANGE UP (ref 0–0.5)
EOSINOPHIL NFR BLD AUTO: 0.1 % — SIGNIFICANT CHANGE UP (ref 0–6)
GLUCOSE SERPL-MCNC: 75 MG/DL — SIGNIFICANT CHANGE UP (ref 70–99)
HCO3 BLDV-SCNC: 26 MMOL/L — SIGNIFICANT CHANGE UP (ref 21–29)
HCT VFR BLD CALC: 35.2 % — LOW (ref 39–50)
HGB BLD-MCNC: 11.3 G/DL — LOW (ref 13–17)
IMM GRANULOCYTES NFR BLD AUTO: 0.7 % — SIGNIFICANT CHANGE UP (ref 0–1.5)
INR BLD: 2.36 RATIO — HIGH (ref 0.88–1.16)
LACTATE SERPL-SCNC: 4.7 MMOL/L — CRITICAL HIGH (ref 0.7–2)
LACTATE SERPL-SCNC: 5.6 MMOL/L — CRITICAL HIGH (ref 0.7–2)
LYMPHOCYTES # BLD AUTO: 0.58 K/UL — LOW (ref 1–3.3)
LYMPHOCYTES # BLD AUTO: 3.9 % — LOW (ref 13–44)
MAGNESIUM SERPL-MCNC: 2.3 MG/DL — SIGNIFICANT CHANGE UP (ref 1.6–2.6)
MCHC RBC-ENTMCNC: 32.1 GM/DL — SIGNIFICANT CHANGE UP (ref 32–36)
MCHC RBC-ENTMCNC: 34.8 PG — HIGH (ref 27–34)
MCV RBC AUTO: 108.3 FL — HIGH (ref 80–100)
MONOCYTES # BLD AUTO: 0.88 K/UL — SIGNIFICANT CHANGE UP (ref 0–0.9)
MONOCYTES NFR BLD AUTO: 5.9 % — SIGNIFICANT CHANGE UP (ref 2–14)
NEUTROPHILS # BLD AUTO: 13.28 K/UL — HIGH (ref 1.8–7.4)
NEUTROPHILS NFR BLD AUTO: 89.3 % — HIGH (ref 43–77)
PCO2 BLDV: 44 MMHG — SIGNIFICANT CHANGE UP (ref 35–50)
PH BLDV: 7.38 — SIGNIFICANT CHANGE UP (ref 7.35–7.45)
PHOSPHATE SERPL-MCNC: 4.4 MG/DL — SIGNIFICANT CHANGE UP (ref 2.5–4.5)
PLATELET # BLD AUTO: 60 K/UL — LOW (ref 150–400)
PO2 BLDV: 225 MMHG — HIGH (ref 25–45)
POTASSIUM SERPL-MCNC: 4.4 MMOL/L — SIGNIFICANT CHANGE UP (ref 3.5–5.3)
POTASSIUM SERPL-SCNC: 4.4 MMOL/L — SIGNIFICANT CHANGE UP (ref 3.5–5.3)
PROCALCITONIN SERPL-MCNC: 2.99 NG/ML — HIGH (ref 0.02–0.1)
PROT SERPL-MCNC: 5.7 GM/DL — LOW (ref 6–8.3)
PROTHROM AB SERPL-ACNC: 26.9 SEC — HIGH (ref 10–12.9)
PTH-INTACT FLD-MCNC: 239 PG/ML — HIGH (ref 15–65)
RBC # BLD: 3.25 M/UL — LOW (ref 4.2–5.8)
RBC # FLD: 22.6 % — HIGH (ref 10.3–14.5)
SAO2 % BLDV: 100 % — HIGH (ref 67–88)
SODIUM SERPL-SCNC: 143 MMOL/L — SIGNIFICANT CHANGE UP (ref 135–145)
WBC # BLD: 14.86 K/UL — HIGH (ref 3.8–10.5)
WBC # FLD AUTO: 14.86 K/UL — HIGH (ref 3.8–10.5)

## 2020-03-08 PROCEDURE — 99233 SBSQ HOSP IP/OBS HIGH 50: CPT

## 2020-03-08 PROCEDURE — 71045 X-RAY EXAM CHEST 1 VIEW: CPT | Mod: 26

## 2020-03-08 PROCEDURE — 99291 CRITICAL CARE FIRST HOUR: CPT

## 2020-03-08 PROCEDURE — 76705 ECHO EXAM OF ABDOMEN: CPT | Mod: 26

## 2020-03-08 PROCEDURE — 93010 ELECTROCARDIOGRAM REPORT: CPT

## 2020-03-08 RX ORDER — DOBUTAMINE HCL 250MG/20ML
5 VIAL (ML) INTRAVENOUS
Qty: 500 | Refills: 0 | Status: DISCONTINUED | OUTPATIENT
Start: 2020-03-08 | End: 2020-03-09

## 2020-03-08 RX ORDER — MIDODRINE HYDROCHLORIDE 2.5 MG/1
10 TABLET ORAL EVERY 8 HOURS
Refills: 0 | Status: DISCONTINUED | OUTPATIENT
Start: 2020-03-08 | End: 2020-03-09

## 2020-03-08 RX ORDER — PHYTONADIONE (VIT K1) 5 MG
5 TABLET ORAL ONCE
Refills: 0 | Status: COMPLETED | OUTPATIENT
Start: 2020-03-08 | End: 2020-03-08

## 2020-03-08 RX ADMIN — Medication 3 MILLILITER(S): at 13:47

## 2020-03-08 RX ADMIN — PANTOPRAZOLE SODIUM 40 MILLIGRAM(S): 20 TABLET, DELAYED RELEASE ORAL at 05:32

## 2020-03-08 RX ADMIN — Medication 5.1 MICROGRAM(S)/KG/MIN: at 23:02

## 2020-03-08 RX ADMIN — AZITHROMYCIN 255 MILLIGRAM(S): 500 TABLET, FILM COATED ORAL at 17:50

## 2020-03-08 RX ADMIN — SEVELAMER CARBONATE 1600 MILLIGRAM(S): 2400 POWDER, FOR SUSPENSION ORAL at 15:08

## 2020-03-08 RX ADMIN — MIDODRINE HYDROCHLORIDE 10 MILLIGRAM(S): 2.5 TABLET ORAL at 15:08

## 2020-03-08 RX ADMIN — MIDODRINE HYDROCHLORIDE 5 MILLIGRAM(S): 2.5 TABLET ORAL at 09:40

## 2020-03-08 RX ADMIN — Medication 101 MILLIGRAM(S): at 09:40

## 2020-03-08 RX ADMIN — MIDODRINE HYDROCHLORIDE 10 MILLIGRAM(S): 2.5 TABLET ORAL at 23:05

## 2020-03-08 RX ADMIN — Medication 3 MILLILITER(S): at 21:19

## 2020-03-08 RX ADMIN — PANTOPRAZOLE SODIUM 40 MILLIGRAM(S): 20 TABLET, DELAYED RELEASE ORAL at 17:50

## 2020-03-08 RX ADMIN — Medication 3 MILLILITER(S): at 01:48

## 2020-03-08 RX ADMIN — Medication 3 MILLILITER(S): at 07:48

## 2020-03-08 RX ADMIN — CEFTRIAXONE 1000 MILLIGRAM(S): 500 INJECTION, POWDER, FOR SOLUTION INTRAMUSCULAR; INTRAVENOUS at 17:50

## 2020-03-08 NOTE — SWALLOW BEDSIDE ASSESSMENT ADULT - ASR SWALLOW DENTITION
incomplete/dentition is in poor condition: teeth are brown, scattered and those that are present are crowded.

## 2020-03-08 NOTE — SWALLOW BEDSIDE ASSESSMENT ADULT - PHARYNGEAL PHASE
Latent onset of pharyngeal swallow, pt needs tiome to organize the coordination of respiration and swallow:  observable and laryngeal lift.  No immediate overt s/s aspiration on thin liquid or on puree.   Very late cough noted after session finished.  Possibly related to clearance of phlegm, and/or of residual in pharynx.

## 2020-03-08 NOTE — SWALLOW BEDSIDE ASSESSMENT ADULT - COMMENTS
74 year old male who presented to ER from a local skilled nursing facility with coffee ground emesis He was admitted for GI Bleeding workup, found to have fever and questionable chest xray for pneumonia and  was diagnosed as pneumonia. Antibiotics were started ceftriaxone and azithromycin., When he developed hypoxia and hypotension rapid response was called and EICU evaluated the patient recommended no transfer to ICU starting the sepsis protocol and give fluid and reconsult if hypotension persists.   pt observed to be coughing post drinking thin liquids.  service is asked to evaluate Pt for po intake and determine the level of diet consistency.   During admission, pt has been on CPaP intermittently.  Able to manage brief periods off 02 support.

## 2020-03-08 NOTE — PROGRESS NOTE ADULT - PROBLEM SELECTOR PLAN 4
Unclear etiology. Normal EF in 2016. Pt unable to elaborate history of MI or intervention. Wean pressors.

## 2020-03-08 NOTE — SWALLOW BEDSIDE ASSESSMENT ADULT - ORAL PHASE
immediate bolus formation; observable and extended collection and re-collection of puree with likely piecemeal AP pulsion.  Adequate clearance of puree

## 2020-03-08 NOTE — SWALLOW BEDSIDE ASSESSMENT ADULT - SWALLOW EVAL: FEEDING ASSISTANCE
minimal assistance required/unclear if pt is an independent eater at this time; Supervise. 02 support required when eating

## 2020-03-08 NOTE — SWALLOW BEDSIDE ASSESSMENT ADULT - NS SPL SWALLOW CLINIC TRIAL FT
Dis with Nsg: Rx puree and thin liquids; drink in single small repetitive sips, no multiple successive swallows.  NO STRAW. Meds crushed in puree, or whole in puree.   Pt verbalized understanding of strategies for thin liquids.  Supervise meals. Assist as necessary.     Service will follow for tolerance and possible upgrade.

## 2020-03-08 NOTE — SWALLOW BEDSIDE ASSESSMENT ADULT - SWALLOW EVAL: RECOMMENDED FEEDING/EATING TECHNIQUES
position upright (90 degrees)/allow for swallow between intakes/crush medication (when feasible)/hard swallow w/ each bite or sip/no straws/small sips/bites/maintain upright posture during/after eating for 30 mins/oral hygiene/alternate food with liquid/check mouth frequently for oral residue/pocketing

## 2020-03-08 NOTE — PROGRESS NOTE ADULT - ASSESSMENT
74 year old male admitted with coffee ground emeissi ( which he had at the facility but none here in this ER) thouhgt to have PNA , found to have increased LFT and INR. Her devleoped acute hypoxic respiratoy failure which is likely secondary to heart failure and volume overload. He was placed on BIPAP but systolic hypotension persisted and eventully requiried IV pressors for liekly decompensated heart failure in the setting of fluids and ESRD.     Hypotensive shock, possibly pneumonia.    Cardiomyopathy with low ejection fraction noted.    GI bleed with hematemesis which has not recurred.  Would continue Protonix.  Patient is at high risk of decompensation with endoscopic intervention at this time secondary to stability from GI perspective, would hold off at this time on endoscopy.  PO as lainey    Case discussed with nurse at bedside    Dr. Kebede to resume care tomorrow.

## 2020-03-08 NOTE — PROGRESS NOTE ADULT - SUBJECTIVE AND OBJECTIVE BOX
PCP:    REQUESTING PHYSICIAN:    REASON FOR CONSULT:    CHIEF COMPLAINT:    HPI:  74 year old male who presented to ER from a local skilled nursing facility with coffee ground emesis He was admitted for GI Bleeding workup, found to have fever and questionable chest xray for pneumonia and  was diagnosed as pneumonia. Antibiotics were started ceftriaxone and azithromycin., When he developed hypoxia and hypotension rapid response was called and EICU evaluated the patient recommended no transfer to ICU starting the sepsis protocol and give fluid and reconsult if hypotension persists.     A second rapid was called a few hrs later  and found him slightly lethargic , on BIPAP and extremely hypotensive with systolic Bp in the 50's. He had increased work of breathing with accessory muscle use and audib He was placed on levophed which ended up causing severe tachycardia which was stopped , and changed to Oh-Synephrine CXR and CT chest are suggestive of b/l pleural effusion and b/l pulmonary edema   volume overload and bilateral pleura effusion .Note patient is ESRD on HD ( tue,thr,sat). Cardiology called after Echo revealed low EF and AS. Pt is awake but unable to vocalize.   3/8/20: More awake. Remains on pressors tapering. Denies chest pain.         PAST MEDICAL & SURGICAL HISTORY:  Anemia  Hypertension  Renal failure  No significant past surgical history      SOCIAL HISTORY:    FAMILY HISTORY:  No pertinent family history in first degree relatives      ALLERGIES:  Allergies    Codeine Phosphate (Nausea)  morphine (Nausea)  prednisoLONE (Nausea)    Intolerances        MEDICATIONS:    MEDICATIONS  (STANDING):  albumin human 25% IVPB 50 milliLiter(s) IV Intermittent <User Schedule>  albuterol/ipratropium for Nebulization 3 milliLiter(s) Nebulizer every 6 hours  /azithromycin  IVPB 500 milliGRAM(s) IV Intermittent every 24 hours  /cefTRIAXone Injectable. 1000 milliGRAM(s) IV Push every 24 hours  midodrine 10 milliGRAM(s) Oral every 8 hours  pantoprazole  Injectable 40 milliGRAM(s) IV Push every 12 hours  phenylephrine    Infusion 0.5 MICROgram(s)/kG/Min (3.188 mL/Hr) IV Continuous <Continuous>  sevelamer carbonate 1600 milliGRAM(s) Oral two times a day with meals    MEDICATIONS  (PRN):  ondansetron Injectable 4 milliGRAM(s) IV Push once PRN Nausea and/or Vomiting        Vital Signs Last 24 Hrs  T(C): 36.4 (08 Mar 2020 04:00), Max: 36.6 (07 Mar 2020 16:00)  T(F): 97.6 (08 Mar 2020 04:00), Max: 97.9 (08 Mar 2020 00:00)  HR: 94 (08 Mar 2020 12:47) (73 - 102)  BP: 95/66 (08 Mar 2020 12:47) (79/46 - 145/121)  BP(mean): 73 (08 Mar 2020 12:47) (52 - 125)  RR: 22 (08 Mar 2020 12:47) (12 - 28)  SpO2: 80% (08 Mar 2020 11:30) (76% - 100%)Daily     Daily I&O's Summary    07 Mar 2020 06:01  -  08 Mar 2020 07:00  --------------------------------------------------------  IN: 590 mL / OUT: 0 mL / NET: 590 mL        PHYSICAL EXAM:    Constitutional: NAD, awake and alert, well-developed  HEENT: PERR, EOMI,  No oral cyananosis.  Neck:  supple,  No JVD  Respiratory: Decreased breath sounds  Cardiovascular: S1S2   Extremities: No peripheral edema. No clubbing or cyanosis.  Vascular: 2+ peripheral pulses  Neurological: A/O x 3, no focal deficits  Musculoskeletal: no calf tenderness.  Skin: No rashes.      LABS: All Labs Reviewed:                        11.3   14.86 )-----------( 60       ( 08 Mar 2020 06:43 )             35.2                         13.6   15.76 )-----------( 84       ( 07 Mar 2020 13:00 )             41.2                         13.0   13.83 )-----------( 70       ( 07 Mar 2020 06:42 )             40.5     08 Mar 2020 06:43    143    |  105    |  35     ----------------------------<  75     4.4     |  25     |  4.07   07 Mar 2020 06:42    143    |  102    |  56     ----------------------------<  97     5.6     |  26     |  5.67   06 Mar 2020 19:54    143    |  103    |  48     ----------------------------<  94     5.0     |  27     |  5.37     Ca    9.4        08 Mar 2020 06:43  Ca    9.5        07 Mar 2020 06:42  Ca    8.8        06 Mar 2020 19:54  Phos  4.4       08 Mar 2020 06:43  Mg     2.3       08 Mar 2020 06:43  Mg     2.5       07 Mar 2020 06:42    TPro  5.7    /  Alb  3.0    /  TBili  2.3    /  DBili  x      /  AST  149    /  ALT  207    /  AlkPhos  93     08 Mar 2020 06:43  TPro  5.6    /  Alb  2.6    /  TBili  2.0    /  DBili  x      /  AST  211    /  ALT  251    /  AlkPhos  108    07 Mar 2020 06:42  TPro  4.7    /  Alb  2.2    /  TBili  1.7    /  DBili  x      /  AST  214    /  ALT  209    /  AlkPhos  96     06 Mar 2020 19:54    PT/INR - ( 08 Mar 2020 06:43 )   PT: 26.9 sec;   INR: 2.36 ratio         PTT - ( 06 Mar 2020 19:54 )  PTT:35.0 sec  CARDIAC MARKERS ( 07 Mar 2020 06:42 )  0.347 ng/mL / x     / x     / x     / x      CARDIAC MARKERS ( 06 Mar 2020 19:54 )  0.270 ng/mL / x     / x     / x     / x          Blood Culture:   03-06 @ 19:54  Pro Bnp [id number]        RADIOLOGY/EKG: < from: 12 Lead ECG (03.07.20 @ 03:54) >  Diagnosis Line Sinus tachycardia with short TX  Left axis deviation  Right bundle branch block  Abnormal ECG  When compared with ECG of 07-MAR-2020 02:37,  Previous ECG has undetermined rhythm, needs review  Right bundle branch block is now Present  Confirmed by JANETT NUÑEZ, CHINTAN ([id number]) on 3/7/2020 7:23:20 AM    < end of copied text >        ECHO/CARDIAC CATHTERIZATION/STRESS TEST:  < from: TTE Echo Doppler w/o Cont (09.08.16 @ 13:32) >  OBSERVATIONS:  Technically difficult study  Mitral Valve: Calcified mitral annulus with normally opening mitral valve   leaflets. Mild mitral regurgitation.  Aortic Valve/Aorta: Calcified trileaflet aortic valve with normal   opening. Moderate to severe aortic insufficiency  Tricuspid Valve: Normal tricuspid valve. Trace tricuspid regurgitation.  Pulmonic Valve: The pulmonic valve is not well visualized. Probably   normal.  Left Atrium: Mild to moderate enlargement  Right Atrium: Normal  Left Ventricle: Mild concentric hypertrophy. Overall preserved left   ventricular function. The EF is approximately 60-65%.  Right Ventricle: Normal right ventricular size and function.  Pericardium/Pleura: No pericardial effusion noted.  Pulmonary/RV Pressure: Insufficient tricuspid regurgitation Doppler in   order to estimate the right ventricular systolic pressure.  LV Diastolic Function: E to a reversal is present consistent with   decreased left ventricular compliance.                   VIDYA WAY M.D., ATTENDING CARDIOLOGIST  This document has been electronically signed. Sep  9 2016  6:15P          < end of copied text >

## 2020-03-08 NOTE — SWALLOW BEDSIDE ASSESSMENT ADULT - SWALLOW EVAL: DIAGNOSIS
moderate oral-pharyngeal dysphagia: poor dentition and slow mastication even of puree:  reduced underlying pulmonary support for deglutition.

## 2020-03-08 NOTE — PROGRESS NOTE ADULT - SUBJECTIVE AND OBJECTIVE BOX
NEPHROLOGY INTERVAL HPI/OVERNIGHT EVENTS:    Date of Service: 20 @ 10:22  3/8 SY  Alert this morning.  Able to tell me his Nephrologist is Dr. Nassar and his home unit is in [place].  Unable to relate recent events.  Admitted to [facility] on 3/3.  unclear recent hosp.    HPI:  75 yo man with ESRD, hx of CAD post Stents (per EMR), and Ileostomy ( done many years ago due to Crohn's disease) sent to ED from [facility] with coffee ground emesis. Pt initially treated for GIB and possible PNA.   Pt's clinical status rapidly deteriorated with worsening hypoxia and hypotension.  Pt admitted to ICU early this am. Now on Bipap mask for oxygenation and on pressor with SBP in 90's.  Unclear hx of ESRD--Appears on HD for several years.  Unclear why and when pt was admitted to [facility].  Pt is on Apixaban.    PMHX and PSHX.  Unable to obtain full hx.  1.ESRD   2.HTN  3.CAD (?stent)  4.Crohn's disease.  Post Ileostomy    MEDICATIONS  (STANDING):  albumin human 25% IVPB 50 milliLiter(s) IV Intermittent <User Schedule>  albuterol/ipratropium for Nebulization 3 milliLiter(s) Nebulizer every 6 hours  azithromycin  IVPB 500 milliGRAM(s) IV Intermittent every 24 hours  cefTRIAXone Injectable. 1000 milliGRAM(s) IV Push every 24 hours  pantoprazole  Injectable 40 milliGRAM(s) IV Push every 12 hours  phenylephrine    Infusion 0.5 MICROgram(s)/kG/Min (3.188 mL/Hr) IV Continuous <Continuous>  sevelamer carbonate 1600 milliGRAM(s) Oral two times a day with meals    MEDICATIONS  (PRN):  midodrine 5 milliGRAM(s) Oral every 8 hours PRN hypotension  ondansetron Injectable 4 milliGRAM(s) IV Push once PRN Nausea and/or Vomiting    Vital Signs Last 24 Hrs  T(C): 36.4 (08 Mar 2020 04:00), Max: 36.6 (07 Mar 2020 13:00)  T(F): 97.6 (08 Mar 2020 04:00), Max: 97.9 (08 Mar 2020 00:00)  HR: 94 (08 Mar 2020 10:00) (73 - 102)  BP: 99/67 (08 Mar 2020 10:00) (72/33 - 145/121)  BP(mean): 75 (08 Mar 2020 10:00) (50 - 125)  RR: 17 (08 Mar 2020 10:00) (12 - 28)  SpO2: 100% (08 Mar 2020 10:00) (76% - 100%)  Daily     Daily Weight in k (07 Mar 2020 11:39)    03-07 @ 06:01  -  0308 @ 07:00  --------------------------------------------------------  IN: 590 mL / OUT: 0 mL / NET: 590 mL    PHYSICAL EXAM: Alert and responsive today  GENERAL: No distress  CHEST/LUNG: fair air entry  HEART: S1S2 RRR  ABDOMEN: soft  EXTREMITIES: no edema  SKIN:     LABS:                        11.3   14.86 )-----------( 60       ( 08 Mar 2020 06:43 )             35.2     03-08    143  |  105  |  35<H>  ----------------------------<  75  4.4   |  25  |  4.07<H>    Ca    9.4      08 Mar 2020 06:43  Phos  4.4     03-08  Mg     2.3     03-08    TPro  5.7<L>  /  Alb  3.0<L>  /  TBili  2.3<H>  /  DBili  x   /  AST  149<H>  /  ALT  207<H>  /  AlkPhos  93  03-08    PT/INR - ( 08 Mar 2020 06:43 )   PT: 26.9 sec;   INR: 2.36 ratio         PTT - ( 06 Mar 2020 19:54 )  PTT:35.0 sec    Magnesium, Serum: 2.3 mg/dL ( @ 06:43)  Phosphorus Level, Serum: 4.4 mg/dL ( @ 06:43)    ABG - ( 07 Mar 2020 06:48 )  pH, Arterial: 7.44  pH, Blood: x     /  pCO2: 38    /  pO2: 394   / HCO3: 26    / Base Excess: 2.2   /  SaO2: 100                   RADIOLOGY & ADDITIONAL TESTS:

## 2020-03-08 NOTE — PROGRESS NOTE ADULT - ASSESSMENT
73yo M admitted 3/6 with initial concern for GI bleeding but admission complicated by:  suspected sepsis due to pneumonia  cardiogenic shock with acute hypoxic respiratory failure due to fluid overload  shock requiring vasopressors  severe lactic metabolic acidosis  coagulopathy - on Eliquis - ?? INR elevated due to liver dz - treated with IV Vitamin K  thrombocytopenia  NSETMI  Cardiomyopathy with EF 20% and severe AS (0.6cm2).  ESRD on HD - s/p dialysis session 3/7  Large B/L pleural effusions on CT - likely result of Cardiomyopathy/decompensated HFrEF  Nurse concerned for aspiration risk given poor ability to tolerate PO water      Plan at this time is for contineud care in ICU  HOB 30  GI and DVT   Speech swallow eval  recheck INR in am  serial procalcitonin levels to assess for infection/duration of ABx admin  empiric Rocephin and Zithromax for now  f/u cultures   cardiology eval/input appreciated  nephrology eval/HD appreciated  titrate pressors for MAP 65-70mmHg  -resume pts Midodrine (5mg BID at home)  repeat lactate and VBG 16:30 and in am  GI input appreciated - no acute indication for endoscopy.  Monitor for s/s of bleeding.  Supportive care 75yo M admitted 3/6 with initial concern for GI bleeding but admission complicated by:  suspected sepsis due to pneumonia  cardiogenic shock with acute hypoxic respiratory failure due to fluid overload  shock requiring vasopressors  severe lactic metabolic acidosis  coagulopathy - on Eliquis - ?? INR elevated due to liver dz - treated with IV Vitamin K  thrombocytopenia  NSETMI  Cardiomyopathy with EF 20% and severe AS (0.6cm2).  ESRD on HD - s/p dialysis session 3/7  Large B/L pleural effusions on CT - likely result of Cardiomyopathy/decompensated HFrEF  passed swallow eval for puree/thin liquid 3/8      Plan at this time is for continued care in ICU  HOB 30  GI and DVT   additional 5mg IVPB vitamin K given - recheck INR in am  serial procalcitonin levels to assess for infection/duration of ABx admin  empiric Rocephin and Zithromax for now  Send blood cultures  cardiology eval/input appreciated  nephrology eval/HD appreciated  titrate pressors for MAP 65-70mmHg  -resume pts Midodrine (5mg BID at home)  repeat lactate 16:00 and in am  IR eval in am in regard to potential B/L thoracenteses   RUQ sono to assess hepatic dz  GI input appreciated - no acute indication for endoscopy.  Monitor for s/s of bleeding.  evaluation for AICD if appropriate given his severe CM  Supportive care

## 2020-03-08 NOTE — SWALLOW BEDSIDE ASSESSMENT ADULT - SLP GENERAL OBSERVATIONS
Pt semi reclining in bed, awake and alert: verbally responsive and interactive: Voice low and breathy 2/2 reduced pulmonary support.  Pt is cachectic with temporal wasting, laryngeal strap musculature starkly visible: larynx prominent.

## 2020-03-08 NOTE — PROGRESS NOTE ADULT - SUBJECTIVE AND OBJECTIVE BOX
GI-coverage for Dr. Kebede    HPI  no events  no vomiting  not eating much   hgb stable  pt seen with RN and discussed at bedside    REVIEW OF SYSTEMS:    cant obtain    PHYSICAL EXAM:  Vital Signs Last 24 Hrs  T(C): 36.4 (08 Mar 2020 14:00), Max: 36.6 (08 Mar 2020 00:00)  T(F): 97.6 (08 Mar 2020 14:00), Max: 97.9 (08 Mar 2020 00:00)  HR: 90 (08 Mar 2020 16:24) (79 - 106)  BP: 93/68 (08 Mar 2020 16:24) (70/53 - 145/121)  BP(mean): 74 (08 Mar 2020 16:24) (56 - 125)  RR: 17 (08 Mar 2020 16:24) (13 - 29)  SpO2: 75% (08 Mar 2020 16:24) (75% - 100%)    Constitutional: frail, confused  HEENT: EOMI, throat clear  Neck: No LAD, supple  Respiratory: CTA and P  Cardiovascular: S1 and S2, RRR, no M  Gastrointestinal: BS+, soft, NT/ND  Extremities: No peripheral edema, neg clubing, cyanosis  Psychiatric: Not speaking  Skin: No rashes    LABS:                        11.3   14.86 )-----------( 60       ( 08 Mar 2020 06:43 )             35.2

## 2020-03-08 NOTE — SWALLOW BEDSIDE ASSESSMENT ADULT - ORAL PREPARATORY PHASE
orientation, mild difficulty with transport 2/2weakness:  oral acceptance and containment; weak lip seal on cup, but pt able to follow directions to take only small sips and swallow one at a time.

## 2020-03-08 NOTE — PROGRESS NOTE ADULT - SUBJECTIVE AND OBJECTIVE BOX
75 yo M admitted to hospital medicine service 3/6 due to coffee ground emesis - coincidently found to have fever and questionable chest x-ray for pneumonia and  was diagnosed as pneumonia. Antibiotics were started ceftriaxone and azithromycin., While waiting for evaluation in ED, pt developed hypoxia and hypotension at which time a rapid response was called and eICU evaluated the patient.  Multiple issues identified but no need for ICU evaluation at that time.    A second rapid was called a few hrs later while pt still waiting for eval in     [facility] PA evaluated the patient at that time - slightly lethargic , on BIPAP and extremely hypotensive with systolic Bp in the 50's (+) increased work of breathing with accessory muscle use - placed on levophed which induced tachycardia and was therefore changed to Oh-Synephrine.  CXR and CT chest are both thought to demonstrate B/L pleural effusions and B/L pulmonary edema - likely suffering from cardiogenic shock with volume overload.    Note patient is ESRD on HD ( tue,thr,sat) unknown if he makes urine .     Pt transferred to ICU and plan made for HD in AM    above d/w AJAY Rodriguez who initially evaluated and treated the patient.    3/7: seen and examined in AM and then again multiple times during HD and post-HD.  Initially pt on NIPPV - Sat 100% and pt uncomfortable and so FiO2 titrated down/pt tried OFF NIPPV - on f/u doing well with NC O2.  HD initiated and pt tolerated with some titration of vasopressors.  AA and interactive but poor historian.  Repeat Hgb reasonable.  No significant evidence of bleeding  Discussed with Dr Kebede and Dr Nelson.      Notably ECHO done - unofficial EF 20% with suspected severe AS valve area 0.6cm2.  D/w Dr. Garcia.    3/8:           Allergies    Codeine Phosphate (Nausea)  morphine (Nausea)  prednisoLONE (Nausea)        ICU Vital Signs Last 24 Hrs  T(C): 36.4 (08 Mar 2020 04:00), Max: 36.6 (07 Mar 2020 16:00)  T(F): 97.6 (08 Mar 2020 04:00), Max: 97.9 (08 Mar 2020 00:00)  HR: 94 (08 Mar 2020 12:47) (73 - 102)  BP: 95/66 (08 Mar 2020 12:47) (79/46 - 145/121)  BP(mean): 73 (08 Mar 2020 12:47) (52 - 125)  RR: 22 (08 Mar 2020 12:47) (12 - 28)  SpO2: 80% (08 Mar 2020 11:30) (76% - 100%)          I&O's Summary    07 Mar 2020 06:01  -  08 Mar 2020 07:00  --------------------------------------------------------  IN: 590 mL / OUT: 0 mL / NET: 590 mL                              11.3   14.86 )-----------( 60       ( 08 Mar 2020 06:43 )             35.2       03-08    143  |  105  |  35<H>  ----------------------------<  75  4.4   |  25  |  4.07<H>    Ca    9.4      08 Mar 2020 06:43  Phos  4.4     03-08  Mg     2.3     03-08    TPro  5.7<L>  /  Alb  3.0<L>  /  TBili  2.3<H>  /  DBili  x   /  AST  149<H>  /  ALT  207<H>  /  AlkPhos  93  03-08      CAPILLARY BLOOD GLUCOSE          LIVER FUNCTIONS - ( 08 Mar 2020 06:43 )  Alb: 3.0 g/dL / Pro: 5.7 gm/dL / ALK PHOS: 93 U/L / ALT: 207 U/L / AST: 149 U/L / GGT: x             CARDIAC MARKERS ( 07 Mar 2020 06:42 )  0.347 ng/mL / x     / x     / x     / x      CARDIAC MARKERS ( 06 Mar 2020 19:54 )  0.270 ng/mL / x     / x     / x     / x            PT/INR - ( 08 Mar 2020 06:43 )   PT: 26.9 sec;   INR: 2.36 ratio         PTT - ( 06 Mar 2020 19:54 )  PTT:35.0 sec    ABG - ( 07 Mar 2020 06:48 )  pH, Arterial: 7.44  pH, Blood: x     /  pCO2: 38    /  pO2: 394   / HCO3: 26    / Base Excess: 2.2   /  SaO2: 100           MEDICATIONS  (STANDING):  albumin human 25% IVPB 50 milliLiter(s) IV Intermittent <User Schedule>  albuterol/ipratropium for Nebulization 3 milliLiter(s) Nebulizer every 6 hours  azithromycin  IVPB 500 milliGRAM(s) IV Intermittent every 24 hours  cefTRIAXone Injectable. 1000 milliGRAM(s) IV Push every 24 hours  midodrine 10 milliGRAM(s) Oral every 8 hours  pantoprazole  Injectable 40 milliGRAM(s) IV Push every 12 hours  phenylephrine    Infusion 0.5 MICROgram(s)/kG/Min (3.188 mL/Hr) IV Continuous <Continuous>  sevelamer carbonate 1600 milliGRAM(s) Oral two times a day with meals    MEDICATIONS  (PRN):  ondansetron Injectable 4 milliGRAM(s) IV Push once PRN Nausea and/or Vomiting      Review of Systems:   · Negative General Symptoms	no fever; no chills	  · Negative Skin Symptoms	no rash; no itching	  · Negative Ophthalmologic Symptoms	no diplopia; no photophobia	  · Negative ENMT Symptoms	no dysphagia	  · Negative Respiratory and Thorax Symptoms	no wheezing; no dyspnea; no cough	  · Negative Cardiovascular Symptoms	no chest pain; no palpitations	  · Negative Gastrointestinal Symptoms	no nausea; no vomiting; no diarrhea	  · Negative General Genitourinary Symptoms	no dysuria	  · Negative Musculoskeletal Symptoms	no muscle weakness	  · Negative Neurological Symptoms	no headache; no loss of consciousness	  · Negative Psychiatric Symptoms	no suicidal ideation; no depression; no anxiety	  · Negative Hematology Symptoms	no gum bleeding; no nose bleeding	  · Negative Allergic Reactions	no photosensitivity; no urticaria	  · Additional ROS	ALL other ROS are NEGATIVE.	    Physical Exam:   · Constitutional	detailed exam	  · Constitutional Details	no distress; cachectic	  · Eyes	detailed exam	  · Eyes Details	PERRL; EOMI	  · ENMT	detailed exam	  · ENMT Details	mouth	  · Mouth	moist	  · Neck	detailed exam 	  · Neck Details	supple; no JVD	  · Back	detailed exam 	  · Back Details	normal shape	  · Respiratory	detailed exam	  · Respiratory Details	airway patent; breath sounds equal; good air movement; respirations non-labored	  · Cardiovascular	detailed exam	  · Cardiovascular Details	irregular rate and rhythm	  · Gastrointestinal	detailed exam	  · GI Normal	soft; nontender; no distention; bowel sounds normal	  · Extremities	detailed exam 	  · Extremities Details	no cyanosis	  · Vascular	Equal and normal pulses (carotid, femoral, dorsalis pedis) 	  · Neurological	detailed exam	  · Mental Status	awake and alert - answers simple Q's appropriately but poor historian	  · Motor	GAXIOLA spont; NO gross deficits	  · Sensory	NO gross deficits	  · Skin	detailed exam	  · Skin Details	warm and dry	  · Musculoskeletal	detailed exam	  · Musculoskeletal Details	normal strength	  · Psychiatric	detailed exam	  · Psychiatric Details	normal affect; normal behavior	      DVT Prophylaxis: Chemoprophylaxis contraindicated due to GI bleeding, venodynes    Advanced Directives: FULL  Discussed with:  TYLER RAND on chart.    Visit Information: 45 minutes of Critical Care time spent providing medical care for patient's acute illness/conditions that impairs at least one vital organ system and/or poses a high risk of imminent or life threatening deterioration in the patient's condition.  It includes time spent reviewing labs, radiology, discussing goals of care with patient and/or family, and discussing the case with a multidisciplinary team in an effort to prevent further life threatening deterioration or end organ damage.  This time is independent of any procedures performed.    ** Time is exclusive of billed procedures and/or teaching and/or routine family updates. 75 yo M admitted to hospital medicine service 3/6 due to coffee ground emesis - coincidently found to have fever and questionable chest x-ray for pneumonia and  was diagnosed as pneumonia. Antibiotics were started ceftriaxone and azithromycin., While waiting for evaluation in ED, pt developed hypoxia and hypotension at which time a rapid response was called and eICU evaluated the patient.  Multiple issues identified but no need for ICU evaluation at that time.    A second rapid was called a few hrs later while pt still waiting for eval in     [facility] PA evaluated the patient at that time - slightly lethargic , on BIPAP and extremely hypotensive with systolic Bp in the 50's (+) increased work of breathing with accessory muscle use - placed on levophed which induced tachycardia and was therefore changed to Oh-Synephrine.  CXR and CT chest are both thought to demonstrate B/L pleural effusions and B/L pulmonary edema - likely suffering from cardiogenic shock with volume overload.    Note patient is ESRD on HD ( tue,thr,sat) unknown if he makes urine .     Pt transferred to ICU and plan made for HD in AM    above d/w AJAY Rodriguez who initially evaluated and treated the patient.    3/7: seen and examined in AM and then again multiple times during HD and post-HD.  Initially pt on NIPPV - Sat 100% and pt uncomfortable and so FiO2 titrated down/pt tried OFF NIPPV - on f/u doing well with NC O2.  HD initiated and pt tolerated with some titration of vasopressors.  AA and interactive but poor historian.  Repeat Hgb reasonable.  No significant evidence of bleeding  Discussed with Dr Kebede and Dr Nelson.      Notably ECHO done - unofficial EF 20% with suspected severe AS valve area 0.6cm2.  D/w Dr. Garcia.    3/8:  pt is somewhat improved, but remains dependent on vasopressors to support survivable MAP - Midodrine dose increased.  Still requiring NIPPV at night.    CXR personally reviewed - persistent B/L effusions.  Seen by speech therapy - Ok for puree with thin liquids.  D/w Cardiology.    Notably lactate had been improving but this morning > 4.          Allergies    Codeine Phosphate (Nausea)  morphine (Nausea)  prednisoLONE (Nausea)        ICU Vital Signs Last 24 Hrs  T(C): 36.4 (08 Mar 2020 04:00), Max: 36.6 (07 Mar 2020 16:00)  T(F): 97.6 (08 Mar 2020 04:00), Max: 97.9 (08 Mar 2020 00:00)  HR: 94 (08 Mar 2020 12:47) (73 - 102)  BP: 95/66 (08 Mar 2020 12:47) (79/46 - 145/121)  BP(mean): 73 (08 Mar 2020 12:47) (52 - 125)  RR: 22 (08 Mar 2020 12:47) (12 - 28)  SpO2: 80% (08 Mar 2020 11:30) (76% - 100%)          I&O's Summary    07 Mar 2020 06:01  -  08 Mar 2020 07:00  --------------------------------------------------------  IN: 590 mL / OUT: 0 mL / NET: 590 mL                              11.3   14.86 )-----------( 60       ( 08 Mar 2020 06:43 )             35.2       03-08    143  |  105  |  35<H>  ----------------------------<  75  4.4   |  25  |  4.07<H>    Ca    9.4      08 Mar 2020 06:43  Phos  4.4     03-08  Mg     2.3     03-08    TPro  5.7<L>  /  Alb  3.0<L>  /  TBili  2.3<H>  /  DBili  x   /  AST  149<H>  /  ALT  207<H>  /  AlkPhos  93  03-08      CAPILLARY BLOOD GLUCOSE          LIVER FUNCTIONS - ( 08 Mar 2020 06:43 )  Alb: 3.0 g/dL / Pro: 5.7 gm/dL / ALK PHOS: 93 U/L / ALT: 207 U/L / AST: 149 U/L / GGT: x             CARDIAC MARKERS ( 07 Mar 2020 06:42 )  0.347 ng/mL / x     / x     / x     / x      CARDIAC MARKERS ( 06 Mar 2020 19:54 )  0.270 ng/mL / x     / x     / x     / x            PT/INR - ( 08 Mar 2020 06:43 )   PT: 26.9 sec;   INR: 2.36 ratio         PTT - ( 06 Mar 2020 19:54 )  PTT:35.0 sec    ABG - ( 07 Mar 2020 06:48 )  pH, Arterial: 7.44  pH, Blood: x     /  pCO2: 38    /  pO2: 394   / HCO3: 26    / Base Excess: 2.2   /  SaO2: 100           MEDICATIONS  (STANDING):  albumin human 25% IVPB 50 milliLiter(s) IV Intermittent <User Schedule>  albuterol/ipratropium for Nebulization 3 milliLiter(s) Nebulizer every 6 hours  azithromycin  IVPB 500 milliGRAM(s) IV Intermittent every 24 hours  cefTRIAXone Injectable. 1000 milliGRAM(s) IV Push every 24 hours  midodrine 10 milliGRAM(s) Oral every 8 hours  pantoprazole  Injectable 40 milliGRAM(s) IV Push every 12 hours  phenylephrine    Infusion 0.5 MICROgram(s)/kG/Min (3.188 mL/Hr) IV Continuous <Continuous>  sevelamer carbonate 1600 milliGRAM(s) Oral two times a day with meals    MEDICATIONS  (PRN):  ondansetron Injectable 4 milliGRAM(s) IV Push once PRN Nausea and/or Vomiting      Review of Systems:   · Negative General Symptoms	no fever; no chills	  · Negative Skin Symptoms	no rash; no itching	  · Negative Ophthalmologic Symptoms	no diplopia; no photophobia	  · Negative ENMT Symptoms	no dysphagia	  · Negative Respiratory and Thorax Symptoms	no wheezing; no dyspnea; no cough	  · Negative Cardiovascular Symptoms	no chest pain; no palpitations	  · Negative Gastrointestinal Symptoms	no nausea; no vomiting; no diarrhea	  · Negative General Genitourinary Symptoms	no dysuria	  · Negative Musculoskeletal Symptoms	no muscle weakness	  · Negative Neurological Symptoms	no headache; no loss of consciousness	  · Negative Psychiatric Symptoms	no suicidal ideation; no depression; no anxiety	  · Negative Hematology Symptoms	no gum bleeding; no nose bleeding	  · Negative Allergic Reactions	no photosensitivity; no urticaria	  · Additional ROS	ALL other ROS are NEGATIVE.	    Physical Exam:   · Constitutional	detailed exam	  · Constitutional Details	no distress; cachectic	  · Eyes	detailed exam	  · Eyes Details	PERRL; EOMI	  · ENMT	detailed exam	  · ENMT Details	mouth	  · Mouth	moist	  · Neck	detailed exam 	  · Neck Details	supple; no JVD	  · Back	detailed exam 	  · Back Details	normal shape	  · Respiratory	detailed exam	  · Respiratory Details	airway patent; breath sounds equal; good air movement; respirations non-labored	  · Cardiovascular	detailed exam	  · Cardiovascular Details	irregular rate and rhythm	  · Gastrointestinal	detailed exam	  · GI Normal	soft; nontender; no distention; bowel sounds normal	  · Extremities	detailed exam 	  · Extremities Details	no cyanosis	  · Vascular	Equal and normal pulses (carotid, femoral, dorsalis pedis) 	  · Neurological	detailed exam	  · Mental Status	awake and alert - answers simple Q's appropriately but poor historian	  · Motor	GAXIOLA spont; NO gross deficits	  · Sensory	NO gross deficits	  · Skin	detailed exam	  · Skin Details	warm and dry	  · Musculoskeletal	detailed exam	  · Musculoskeletal Details	normal strength	  · Psychiatric	detailed exam	  · Psychiatric Details	normal affect; normal behavior	      DVT Prophylaxis: Chemoprophylaxis contraindicated due to GI bleeding, venodynes    Advanced Directives: FULL  Discussed with:  TYLER RAND on chart.    Visit Information: 45 minutes of Critical Care time spent providing medical care for patient's acute illness/conditions that impairs at least one vital organ system and/or poses a high risk of imminent or life threatening deterioration in the patient's condition.  It includes time spent reviewing labs, radiology, discussing goals of care with patient and/or family, and discussing the case with a multidisciplinary team in an effort to prevent further life threatening deterioration or end organ damage.  This time is independent of any procedures performed.    ** Time is exclusive of billed procedures and/or teaching and/or routine family updates.

## 2020-03-08 NOTE — ED ADULT NURSE NOTE - SEPSIS REFERENCE DATA FOR CRITERIA 1 WBC
LACTATION NOTE - INFANT    Evaluation Type  Evaluation Type: Inpatient    Problems & Assessment  Infant Assessment: Oral mucous membranes moist;Skin color: pink or appropriate for ethnicity; Minimal hunger cues present;Good skin turgor  Muscle tone: Appropr Abnormal WBC: < 4,000 OR > 12,000

## 2020-03-08 NOTE — PROGRESS NOTE ADULT - ASSESSMENT
75 yo man with ESRD, HTN and unclear cardiac hx admitted with shock and resp failure.  Etiology of shock unclear at this point,  septic vs cardiogenic.  HD today for fluid removal to stabilize resp status.  Echo to assess LV function.  Continue pressor support and abtx.    3/8 SY  --ESRD : post HD yesterday.  UF ~ 4 kg with pressor support and SPA.  --CHF : EF noted 20%.  Unclear cardiac event and hx.  Resp status improved  --Monitor nutrition.  --Will need to obtain recent medical hx.  --Next HD in am.

## 2020-03-08 NOTE — PROGRESS NOTE ADULT - SUBJECTIVE AND OBJECTIVE BOX
Patient is a 74y old  Male who presents with a chief complaint of coffee ground emesis (09 Mar 2020 02:00)      BRIEF HOSPITAL COURSE: 75 y/o male with pmhx of hypertension, ESRD on HD T/Th/Sat who was admitted 3/6 for pneumonia with hospital course complicated by RRT for hypotension requiring levophed, which was switched to phenylephrine due to tachycardia. found to have b/l large pleural effusions. TTE performed 2 years ago was normal, but TTE on this admission with new findings of EF of 20%, patient admitted to ICU with presumed cardiogenic shock with acute hypoxic respiratory failure secondary to pulmonary edema and pleural effusions. Hospital course further complicated by episode of hematemesis that self resolved without requiring transfusion.     Events last 24 hours: respiratory status has improved and he has remained off of bipap. patient began having escalating phenylephrine requirements with rising lactate. started on dobutamine for presumed cardiogenic shock.     PAST MEDICAL & SURGICAL HISTORY:  Anemia  Hypertension  Renal failure  No significant past surgical history      Review of Systems:  CONSTITUTIONAL: No fever, chills, or fatigue  EYES: No eye pain, visual disturbances, or discharge  ENMT:  No difficulty hearing, tinnitus, vertigo; No sinus or throat pain  NECK: No pain or stiffness  RESPIRATORY: No cough, wheezing, chills or hemoptysis; No shortness of breath  CARDIOVASCULAR: No chest pain, palpitations, dizziness, or leg swelling  GASTROINTESTINAL: No abdominal or epigastric pain. No nausea, vomiting, or hematemesis; No diarrhea or constipation. No melena or hematochezia.  GENITOURINARY: No dysuria, frequency, hematuria, or incontinence  NEUROLOGICAL: No headaches, memory loss, loss of strength, numbness, or tremors  SKIN: No itching, burning, rashes, or lesions   MUSCULOSKELETAL: No joint pain or swelling; No muscle, back, or extremity pain  PSYCHIATRIC: No depression, anxiety, mood swings, or difficulty sleeping      Medications:  azithromycin  IVPB 500 milliGRAM(s) IV Intermittent every 24 hours  cefTRIAXone Injectable. 1000 milliGRAM(s) IV Push every 24 hours    DOBUTamine Infusion 5 MICROgram(s)/kG/Min IV Continuous <Continuous>  midodrine 10 milliGRAM(s) Oral every 8 hours  phenylephrine    Infusion 0.5 MICROgram(s)/kG/Min IV Continuous <Continuous>    albuterol/ipratropium for Nebulization 3 milliLiter(s) Nebulizer every 6 hours    ondansetron Injectable 4 milliGRAM(s) IV Push once PRN        pantoprazole  Injectable 40 milliGRAM(s) IV Push every 12 hours        albumin human 25% IVPB 50 milliLiter(s) IV Intermittent <User Schedule>        sevelamer carbonate 1600 milliGRAM(s) Oral two times a day with meals          ICU Vital Signs Last 24 Hrs  T(C): 35.9 (09 Mar 2020 00:00), Max: 36.4 (08 Mar 2020 14:00)  T(F): 96.7 (09 Mar 2020 00:00), Max: 97.6 (08 Mar 2020 14:00)  HR: 89 (09 Mar 2020 02:30) (82 - 106)  BP: 117/60 (09 Mar 2020 02:30) (65/43 - 125/42)  BP(mean): 70 (09 Mar 2020 02:30) (48 - 106)  ABP: --  ABP(mean): --  RR: 21 (09 Mar 2020 02:30) (12 - 35)  SpO2: 89% (09 Mar 2020 02:00) (75% - 100%)      ABG - ( 07 Mar 2020 06:48 )  pH, Arterial: 7.44  pH, Blood: x     /  pCO2: 38    /  pO2: 394   / HCO3: 26    / Base Excess: 2.2   /  SaO2: 100                 I&O's Detail    07 Mar 2020 06:01  -  08 Mar 2020 07:00  --------------------------------------------------------  IN:    IV PiggyBack: 250 mL    phenylephrine   Infusion: 340 mL  Total IN: 590 mL    OUT:  Total OUT: 0 mL    Total NET: 590 mL      08 Mar 2020 07:01  -  09 Mar 2020 05:08  --------------------------------------------------------  IN:    IV PiggyBack: 250 mL    Oral Fluid: 300 mL    phenylephrine   Infusion: 300 mL  Total IN: 850 mL    OUT:  Total OUT: 0 mL    Total NET: 850 mL            LABS:                        11.3   14.86 )-----------( 60       ( 08 Mar 2020 06:43 )             35.2     03-08    143  |  105  |  35<H>  ----------------------------<  75  4.4   |  25  |  4.07<H>    Ca    9.4      08 Mar 2020 06:43  Phos  4.4     03-08  Mg     2.3     03-08    TPro  5.7<L>  /  Alb  3.0<L>  /  TBili  2.3<H>  /  DBili  x   /  AST  149<H>  /  ALT  207<H>  /  AlkPhos  93  03-08      CARDIAC MARKERS ( 09 Mar 2020 00:06 )  0.501 ng/mL / x     / 82 U/L / x     / x      CARDIAC MARKERS ( 07 Mar 2020 06:42 )  0.347 ng/mL / x     / x     / x     / x          CAPILLARY BLOOD GLUCOSE        PT/INR - ( 08 Mar 2020 06:43 )   PT: 26.9 sec;   INR: 2.36 ratio             CULTURES:      Physical Examination:    General: No acute distress.      HEENT: Pupils equal, reactive to light.  Symmetric.    PULM: diminished at bases bilaterally, no significant sputum production    NECK: Supple, no lymphadenopathy, trachea midline    CVS: Regular rate and rhythm, no murmurs, rubs, or gallops    ABD: Soft, nondistended but tender to palpation in LUQ.     SKIN: Warm and well perfused, no rashes noted.    NEURO: Alert, oriented, interactive, nonfocal      RADIOLOGY: reviewed    CRITICAL CARE TIME SPENT: 40 minutes of critical care time spent providing medical care for patient's acute illness/conditions that impairs at least one vital organ system and/or poses a high risk of imminent or life threatening deterioration in the patient's condition. It includes time spent evaluating and treating the patient's acute illness as well as time spent reviewing labs, radiology, discussing goals of care with patient and/or patient's family, and discussing the case with a multidisciplinary team in an effort to prevent further life threatening deterioration or end organ damage. This time is independent of any procedures performed.

## 2020-03-08 NOTE — PROGRESS NOTE ADULT - ASSESSMENT
73 y/o male with pmhx of hypertension, ESRD on HD T//Sat who was admitted 3 for pneumonia with hospital course complicated by RRT for hypotension requiring levophed, which was switched to phenylephrine due to tachycardia. found to have b/l large pleural effusions. TTE performed 2 years ago was normal, but TTE on this admission with new findings of EF of 20%, patient admitted to ICU with presumed cardiogenic shock with acute hypoxic respiratory failure secondary to pulmonary edema and pleural effusions. Hospital course further complicated by episode of hematemesis that self resolved without requiring transfusion.     Events last 24 hours: respiratory status has improved and he has remained off of bipap. patient began having escalating phenylephrine requirements with rising lactate. started on dobutamine for presumed cardiogenic shock.     Given lactate that has nearly doubled, must rule out ischemic bowel with CT scan with IV contrast. Patient to be dialyzed this morning.     With increased LFTs from possible hepatic congestion from heart failure and worsening shock state, uptrending cardiac enzymes, will discuss with day team and cardiology necessity of transfer for high risk cath. Continue dobutamine as phenylephrine requriements have improved on . phenylephrine not idea pressor to use during cardiogenic shock, but cannot tolerate levophed due to tachycardia. goal MAP 65-70.     although patients lactate is 9.7, he is not acidotic and clinically appears comfortable and per RT and nurse familiar with patient, this is the best he has appeared since arrival.     Respiratory status stable off of bipap. refused nocturnal bipap but stable on nasal cannula. will check abg in am. Plan for IR thoracentesis if coags improve.    above case and plan discussed with eicu attendings.

## 2020-03-09 DIAGNOSIS — I35.0 NONRHEUMATIC AORTIC (VALVE) STENOSIS: ICD-10-CM

## 2020-03-09 LAB
ADD ON TEST-SPECIMEN IN LAB: SIGNIFICANT CHANGE UP
ALBUMIN SERPL ELPH-MCNC: 2.8 G/DL — LOW (ref 3.3–5)
ALP SERPL-CCNC: 97 U/L — SIGNIFICANT CHANGE UP (ref 40–120)
ALT FLD-CCNC: 215 U/L — HIGH (ref 12–78)
ANION GAP SERPL CALC-SCNC: 11 MMOL/L — SIGNIFICANT CHANGE UP (ref 5–17)
AST SERPL-CCNC: 133 U/L — HIGH (ref 15–37)
BASE EXCESS BLDA CALC-SCNC: 0.6 MMOL/L — SIGNIFICANT CHANGE UP (ref -2–2)
BASE EXCESS BLDV CALC-SCNC: -0.4 MMOL/L — SIGNIFICANT CHANGE UP (ref -2–2)
BILIRUB SERPL-MCNC: 1.7 MG/DL — HIGH (ref 0.2–1.2)
BLOOD GAS COMMENTS ARTERIAL: SIGNIFICANT CHANGE UP
BUN SERPL-MCNC: 51 MG/DL — HIGH (ref 7–23)
CALCIUM SERPL-MCNC: 9 MG/DL — SIGNIFICANT CHANGE UP (ref 8.5–10.1)
CHLORIDE SERPL-SCNC: 104 MMOL/L — SIGNIFICANT CHANGE UP (ref 96–108)
CK SERPL-CCNC: 82 U/L — SIGNIFICANT CHANGE UP (ref 26–308)
CO2 SERPL-SCNC: 27 MMOL/L — SIGNIFICANT CHANGE UP (ref 22–31)
CREAT SERPL-MCNC: 5.2 MG/DL — HIGH (ref 0.5–1.3)
GAS PNL BLDA: SIGNIFICANT CHANGE UP
GLUCOSE SERPL-MCNC: 119 MG/DL — HIGH (ref 70–99)
HCO3 BLDA-SCNC: 24 MMOL/L — SIGNIFICANT CHANGE UP (ref 21–29)
HCO3 BLDV-SCNC: 22 MMOL/L — SIGNIFICANT CHANGE UP (ref 21–29)
HCT VFR BLD CALC: 33.6 % — LOW (ref 39–50)
HGB BLD-MCNC: 10.9 G/DL — LOW (ref 13–17)
INR BLD: 2.26 RATIO — HIGH (ref 0.88–1.16)
LACTATE SERPL-SCNC: 4.9 MMOL/L — CRITICAL HIGH (ref 0.7–2)
LACTATE SERPL-SCNC: 8.7 MMOL/L — CRITICAL HIGH (ref 0.7–2)
MCHC RBC-ENTMCNC: 32.4 GM/DL — SIGNIFICANT CHANGE UP (ref 32–36)
MCHC RBC-ENTMCNC: 35.3 PG — HIGH (ref 27–34)
MCV RBC AUTO: 108.7 FL — HIGH (ref 80–100)
PCO2 BLDA: 33 MMHG — SIGNIFICANT CHANGE UP (ref 32–46)
PCO2 BLDV: 28 MMHG — LOW (ref 35–50)
PH BLDA: 7.47 — HIGH (ref 7.35–7.45)
PH BLDV: 7.5 — HIGH (ref 7.35–7.45)
PLATELET # BLD AUTO: 45 K/UL — LOW (ref 150–400)
PO2 BLDA: 140 MMHG — HIGH (ref 74–108)
PO2 BLDV: 219 MMHG — HIGH (ref 25–45)
POTASSIUM SERPL-MCNC: 4.2 MMOL/L — SIGNIFICANT CHANGE UP (ref 3.5–5.3)
POTASSIUM SERPL-SCNC: 4.2 MMOL/L — SIGNIFICANT CHANGE UP (ref 3.5–5.3)
PROCALCITONIN SERPL-MCNC: 2.63 NG/ML — HIGH (ref 0.02–0.1)
PROT SERPL-MCNC: 5.1 GM/DL — LOW (ref 6–8.3)
PROTHROM AB SERPL-ACNC: 25.7 SEC — HIGH (ref 10–12.9)
RBC # BLD: 3.09 M/UL — LOW (ref 4.2–5.8)
RBC # FLD: 22.6 % — HIGH (ref 10.3–14.5)
SAO2 % BLDA: 99 % — HIGH (ref 92–96)
SAO2 % BLDV: 100 % — HIGH (ref 67–88)
SODIUM SERPL-SCNC: 142 MMOL/L — SIGNIFICANT CHANGE UP (ref 135–145)
TROPONIN I SERPL-MCNC: 0.5 NG/ML — HIGH (ref 0.01–0.04)
TROPONIN I SERPL-MCNC: 0.57 NG/ML — HIGH (ref 0.01–0.04)
WBC # BLD: 10.74 K/UL — HIGH (ref 3.8–10.5)
WBC # FLD AUTO: 10.74 K/UL — HIGH (ref 3.8–10.5)

## 2020-03-09 PROCEDURE — 71045 X-RAY EXAM CHEST 1 VIEW: CPT | Mod: 26

## 2020-03-09 PROCEDURE — 99233 SBSQ HOSP IP/OBS HIGH 50: CPT

## 2020-03-09 PROCEDURE — 92950 HEART/LUNG RESUSCITATION CPR: CPT

## 2020-03-09 PROCEDURE — 93010 ELECTROCARDIOGRAM REPORT: CPT

## 2020-03-09 PROCEDURE — 31500 INSERT EMERGENCY AIRWAY: CPT

## 2020-03-09 PROCEDURE — 74177 CT ABD & PELVIS W/CONTRAST: CPT | Mod: 26

## 2020-03-09 PROCEDURE — 99291 CRITICAL CARE FIRST HOUR: CPT | Mod: 25

## 2020-03-09 RX ORDER — NOREPINEPHRINE BITARTRATE/D5W 8 MG/250ML
0.05 PLASTIC BAG, INJECTION (ML) INTRAVENOUS
Qty: 8 | Refills: 0 | Status: DISCONTINUED | OUTPATIENT
Start: 2020-03-09 | End: 2020-03-09

## 2020-03-09 RX ADMIN — Medication 3 MILLILITER(S): at 09:13

## 2020-03-09 RX ADMIN — Medication 3 MILLILITER(S): at 14:16

## 2020-03-09 RX ADMIN — MIDODRINE HYDROCHLORIDE 10 MILLIGRAM(S): 2.5 TABLET ORAL at 05:34

## 2020-03-09 RX ADMIN — SEVELAMER CARBONATE 1600 MILLIGRAM(S): 2400 POWDER, FOR SUSPENSION ORAL at 01:18

## 2020-03-09 RX ADMIN — AZITHROMYCIN 255 MILLIGRAM(S): 500 TABLET, FILM COATED ORAL at 16:48

## 2020-03-09 RX ADMIN — PANTOPRAZOLE SODIUM 40 MILLIGRAM(S): 20 TABLET, DELAYED RELEASE ORAL at 17:31

## 2020-03-09 RX ADMIN — PANTOPRAZOLE SODIUM 40 MILLIGRAM(S): 20 TABLET, DELAYED RELEASE ORAL at 05:34

## 2020-03-09 RX ADMIN — Medication 3 MILLILITER(S): at 01:48

## 2020-03-09 RX ADMIN — PHENYLEPHRINE HYDROCHLORIDE 3.19 MICROGRAM(S)/KG/MIN: 10 INJECTION INTRAVENOUS at 08:25

## 2020-03-09 RX ADMIN — Medication 3.19 MICROGRAM(S)/KG/MIN: at 10:26

## 2020-03-09 RX ADMIN — CEFTRIAXONE 1000 MILLIGRAM(S): 500 INJECTION, POWDER, FOR SOLUTION INTRAMUSCULAR; INTRAVENOUS at 16:43

## 2020-03-09 RX ADMIN — MIDODRINE HYDROCHLORIDE 10 MILLIGRAM(S): 2.5 TABLET ORAL at 13:26

## 2020-03-09 NOTE — DISCHARGE NOTE FOR THE EXPIRED PATIENT - HOSPITAL COURSE
Admitted with GIB.  Patient developed cardiogenic shock with a new EF of 20% and Aortic stenosis.  He was on 2 Pressors and inotropis.  Patient went in asystole.  S/P 4 epi and 1 bicarb.  Briefly a pulse was regained

## 2020-03-09 NOTE — PROGRESS NOTE ADULT - PROBLEM SELECTOR PLAN 2
EF 20% on echo this admit.  Prior echo '16 w/ 60% EF.  Likely related to severe aortic stenosis, cannot r/o obstructive CAD. Per pt had cardiac cath at  [facility].  Recommend uptitration of neosynphrine & dobutamine for now.  CVP 5-7 appears euvolemic on exam but bilateral pleural effusions on CXR.  Dialysis on hold given borderline BP on pressors.

## 2020-03-09 NOTE — PROGRESS NOTE ADULT - ASSESSMENT
75 yo man with ESRD, HTN and unclear cardiac hx admitted with shock and resp failure.  Etiology of shock unclear at this point,  septic vs cardiogenic.  HD today for fluid removal to stabilize resp status.  Echo to assess LV function.  Continue pressor support and abtx.    3/8 SY  --ESRD : post HD yesterday.  UF ~ 4 kg with pressor support and SPA.  --CHF : EF noted 20%.  Unclear cardiac event and hx.  Resp status improved  --Monitor nutrition.  --Will need to obtain recent medical hx.  --Next HD in am.    3/9 MK   - ESRD with lactic acidosis improving remains on dual pressors with     moderate pleural effusion and ? new severe AS on recent echo with     change in EF from 2016    would benefit from thoracentesis    will attempt some fluid removal     trying to contact HD unit about baseline BP   dw yaneth santos and marie and rn 73 yo man with ESRD, HTN and unclear cardiac hx admitted with shock and resp failure.  Etiology of shock unclear at this point,  septic vs cardiogenic.  HD today for fluid removal to stabilize resp status.  Echo to assess LV function.  Continue pressor support and abtx.    3/8 SY  --ESRD : post HD yesterday.  UF ~ 4 kg with pressor support and SPA.  --CHF : EF noted 20%.  Unclear cardiac event and hx.  Resp status improved  --Monitor nutrition.  --Will need to obtain recent medical hx.  --Next HD in am.    3/9 MK   - ESRD with lactic acidosis improving remains on dual pressors with     moderate pleural effusion and ? new severe AS on recent echo with     change in EF from 2016    would benefit from thoracentesis    will attempt some fluid removal     trying to contact HD unit about baseline BP   - exophytic indeterminate lesion: sono when clincally stable     dw yaneth santos and marie and rn 73 yo man with ESRD, HTN and unclear cardiac hx admitted with shock and resp failure.  Etiology of shock unclear at this point,  septic vs cardiogenic.  HD today for fluid removal to stabilize resp status.  Echo to assess LV function.  Continue pressor support and abtx.    3/8 SY  --ESRD : post HD yesterday.  UF ~ 4 kg with pressor support and SPA.  --CHF : EF noted 20%.  Unclear cardiac event and hx.  Resp status improved  --Monitor nutrition.  --Will need to obtain recent medical hx.  --Next HD in am.    3/9 MK   - ESRD with lactic acidosis improving remains on dual pressors with     moderate pleural effusion and ? new severe AS on recent echo with     change in EF from 2016    would benefit from thoracentesis    will attempt some fluid removal     trying to contact HD unit about baseline BP   - exophytic indeterminate lesion: sono when clincally stable     dw yaneth santos and marie and rn     addenum   as per wife, pt recently at [facility] and was being considered for valve replacement but in poor conditioning  and deferred  spoke to primary HD unit ( [facility]), bp usually is sbp of 130s  cvp 5-7, will hold HD today

## 2020-03-09 NOTE — PROGRESS NOTE ADULT - PROBLEM SELECTOR PLAN 1
severe aortic stenosis w/ high mean gradients (49 mmHg) despite low EF.  Discussed w/ pt & caregiver - was considered a candidate for valve replacement (TAVR?) at [facility].  cardiac cath performed there.  Will request records.

## 2020-03-09 NOTE — PROCEDURE NOTE - NSPROCDETAILS_GEN_ALL_CORE
ultrasound guidance/location identified, draped/prepped, sterile technique used, needle inserted/introduced/all materials/supplies accounted for at end of procedure
sterile technique, catheter placed/lumen(s) aspirated and flushed/sterile dressing applied/ultrasound guidance/guidewire recovered

## 2020-03-09 NOTE — PROGRESS NOTE ADULT - REASON FOR ADMISSION
coffee ground emesis

## 2020-03-09 NOTE — DISCHARGE NOTE FOR THE EXPIRED PATIENT - SECONDARY DIAGNOSIS.
Hypertension, unspecified type Acute pulmonary edema Stage 5 chronic kidney disease on chronic dialysis

## 2020-03-09 NOTE — PROGRESS NOTE ADULT - SUBJECTIVE AND OBJECTIVE BOX
NEPHROLOGY INTERVAL HPI/OVERNIGHT EVENTS:  03-09-20 @ 10:19  events noted,  remains on dual pressors, dobutamine and levo   ct with iv contrast overnight for rising lactic acidosis: no ischemia colitis  large pleural effusions  ef now 20% with severe AS  2016 with ef of 60%         MEDICATIONS  (STANDING):  albumin human 25% IVPB 50 milliLiter(s) IV Intermittent <User Schedule>  albuterol/ipratropium for Nebulization 3 milliLiter(s) Nebulizer every 6 hours  azithromycin  IVPB 500 milliGRAM(s) IV Intermittent every 24 hours  cefTRIAXone Injectable. 1000 milliGRAM(s) IV Push every 24 hours  DOBUTamine Infusion 5 MICROgram(s)/kG/Min (5.1 mL/Hr) IV Continuous <Continuous>  midodrine 10 milliGRAM(s) Oral every 8 hours  norepinephrine Infusion 0.05 MICROgram(s)/kG/Min (3.188 mL/Hr) IV Continuous <Continuous>  pantoprazole  Injectable 40 milliGRAM(s) IV Push every 12 hours  phenylephrine    Infusion 0.5 MICROgram(s)/kG/Min (3.188 mL/Hr) IV Continuous <Continuous>  sevelamer carbonate 1600 milliGRAM(s) Oral two times a day with meals    MEDICATIONS  (PRN):  ondansetron Injectable 4 milliGRAM(s) IV Push once PRN Nausea and/or Vomiting      Allergies    Codeine Phosphate (Nausea)  morphine (Nausea)  prednisoLONE (Nausea)    Intolerances        I&O's Detail    08 Mar 2020 07:01  -  09 Mar 2020 07:00  --------------------------------------------------------  IN:    DOBUTamine Infusion: 32 mL    IV PiggyBack: 250 mL    Oral Fluid: 300 mL    phenylephrine   Infusion: 581 mL  Total IN: 1163 mL    OUT:  Total OUT: 0 mL    Total NET: 1163 mL          .    Patient was seen and evaluated on dialysis.   Patient is tolerating the procedure well.   Continue dialysis:   Dialyzer:          QB:        QD:   Goal UF ___ over ___ Hours       Vital Signs Last 24 Hrs  T(C): 36.4 (09 Mar 2020 08:00), Max: 36.4 (08 Mar 2020 14:00)  T(F): 97.5 (09 Mar 2020 08:00), Max: 97.6 (08 Mar 2020 14:00)  HR: 90 (09 Mar 2020 10:00) (82 - 106)  BP: 80/50 (09 Mar 2020 10:00) (65/43 - 117/60)  BP(mean): 58 (09 Mar 2020 10:00) (48 - 85)  RR: 15 (09 Mar 2020 10:00) (12 - 35)  SpO2: 97% (09 Mar 2020 10:00) (75% - 100%)  Daily     Daily     PHYSICAL EXAM:  General: alert. awake Ox3  HEENT: MMM  CV: s1s2 rrr  LUNGS: B/L CTA  EXT: no edema    LABS:                        10.9   10.74 )-----------( 45       ( 09 Mar 2020 06:28 )             33.6     03-09    142  |  104  |  51<H>  ----------------------------<  119<H>  4.2   |  27  |  5.20<H>    Ca    9.0      09 Mar 2020 06:28  Phos  5.1     03-09  Mg     2.3     03-08    TPro  5.1<L>  /  Alb  2.8<L>  /  TBili  1.7<H>  /  DBili  x   /  AST  133<H>  /  ALT  215<H>  /  AlkPhos  97  03-09    PT/INR - ( 09 Mar 2020 06:28 )   PT: 25.7 sec;   INR: 2.26 ratio             Phosphorus Level, Serum: 5.1 mg/dL (03-09 @ 06:28)    ABG - ( 09 Mar 2020 05:56 )  pH, Arterial: 7.47  pH, Blood: x     /  pCO2: 33    /  pO2: 140   / HCO3: 24    / Base Excess: .6    /  SaO2: 99 NEPHROLOGY INTERVAL HPI/OVERNIGHT EVENTS:  03-09-20 @ 10:19  events noted,  remains on dual pressors, dobutamine and levo   ct with iv contrast overnight for rising lactic acidosis: no ischemia colitis  large pleural effusions  ef now 20% with severe AS  2016 with ef of 60%         MEDICATIONS  (STANDING):  albumin human 25% IVPB 50 milliLiter(s) IV Intermittent <User Schedule>  albuterol/ipratropium for Nebulization 3 milliLiter(s) Nebulizer every 6 hours  azithromycin  IVPB 500 milliGRAM(s) IV Intermittent every 24 hours  cefTRIAXone Injectable. 1000 milliGRAM(s) IV Push every 24 hours  DOBUTamine Infusion 5 MICROgram(s)/kG/Min (5.1 mL/Hr) IV Continuous <Continuous>  midodrine 10 milliGRAM(s) Oral every 8 hours  norepinephrine Infusion 0.05 MICROgram(s)/kG/Min (3.188 mL/Hr) IV Continuous <Continuous>  pantoprazole  Injectable 40 milliGRAM(s) IV Push every 12 hours  phenylephrine    Infusion 0.5 MICROgram(s)/kG/Min (3.188 mL/Hr) IV Continuous <Continuous>  sevelamer carbonate 1600 milliGRAM(s) Oral two times a day with meals    MEDICATIONS  (PRN):  ondansetron Injectable 4 milliGRAM(s) IV Push once PRN Nausea and/or Vomiting      Allergies    Codeine Phosphate (Nausea)  morphine (Nausea)  prednisoLONE (Nausea)    Intolerances        I&O's Detail    08 Mar 2020 07:01  -  09 Mar 2020 07:00  --------------------------------------------------------  IN:    DOBUTamine Infusion: 32 mL    IV PiggyBack: 250 mL    Oral Fluid: 300 mL    phenylephrine   Infusion: 581 mL  Total IN: 1163 mL    OUT:  Total OUT: 0 mL    Total NET: 1163 mL          .    Patient was seen and evaluated on dialysis.   Patient is tolerating the procedure well.   Continue dialysis:   Dialyzer:          QB:        QD:   Goal UF ___ over ___ Hours       Vital Signs Last 24 Hrs  T(C): 36.4 (09 Mar 2020 08:00), Max: 36.4 (08 Mar 2020 14:00)  T(F): 97.5 (09 Mar 2020 08:00), Max: 97.6 (08 Mar 2020 14:00)  HR: 90 (09 Mar 2020 10:00) (82 - 106)  BP: 80/50 (09 Mar 2020 10:00) (65/43 - 117/60)  BP(mean): 58 (09 Mar 2020 10:00) (48 - 85)  RR: 15 (09 Mar 2020 10:00) (12 - 35)  SpO2: 97% (09 Mar 2020 10:00) (75% - 100%)  Daily     Daily     PHYSICAL EXAM:  General: alert. awake Ox3  HEENT: MMM  CV: s1s2 rrr  LUNGS: B/L CTA  EXT: no edema    LABS:                        10.9   10.74 )-----------( 45       ( 09 Mar 2020 06:28 )             33.6     03-09    142  |  104  |  51<H>  ----------------------------<  119<H>  4.2   |  27  |  5.20<H>    Ca    9.0      09 Mar 2020 06:28  Phos  5.1     03-09  Mg     2.3     03-08    TPro  5.1<L>  /  Alb  2.8<L>  /  TBili  1.7<H>  /  DBili  x   /  AST  133<H>  /  ALT  215<H>  /  AlkPhos  97  03-09    PT/INR - ( 09 Mar 2020 06:28 )   PT: 25.7 sec;   INR: 2.26 ratio         Phosphorus Level, Serum: 5.1 mg/dL (03-09 @ 06:28)    ABG - ( 09 Mar 2020 05:56 )  pH, Arterial: 7.47  pH, Blood: x     /  pCO2: 33    /  pO2: 140   / HCO3: 24    / Base Excess: .6    /  SaO2: 99        EXAM:  CT ABDOMEN AND PELVIS IC                            PROCEDURE DATE:  03/09/2020          INTERPRETATION:  PROCEDURE INFORMATION:   Exam: CT Abdomen And Pelvis With Contrast   Exam date and time: 3/9/2020 3:28 AM   Age: 74 years old   Clinical indication: Other: . ; Patient HX: On hd. Rising lactate. Hematemesis     TECHNIQUE:   Imaging protocol: Computed tomography of the abdomen and pelvis with   intravenous contrast.   Contrast material: OMNI 350; Contrast volume: 70 ml; Contrast route: IV;      COMPARISON:   No relevant prior studies available.     FINDINGS:   Lungs: Compressive atelectasis and the lower lobes bilaterally.   Pleural space: Large bilateral pleural effusions.   Heart: Cardiomegaly and aortic valvular calcifications.     Liver: Multiple hepatic cysts.   Gallbladder and bile ducts: Cholelithiasis. No cholecystitis or biliary ductal   dilatation.   Pancreas: Normal. No ductal dilation.   Spleen: Normal. No splenomegaly.   Adrenals: Normal. No mass.   Kidneys and ureters: Chronic medical renal disease. Nonobstructing stones in  the kidneys bilaterally. Scattered bilateral renal cysts. Indeterminate 1.6 cm left renal lesion. Heavily calcified 7 mm interpolar left renal lesion.    Stomach and bowel: Prior proctocolectomy and right lower quadrant ileostomy. No bowel obstruction, wall thickening, pneumatosis or inflammatory change.  Intraperitoneal space: Trace ascites.   Vasculature: IVC filter in place. Endograft in the visualized lower thoracic and suprarenal abdominal aorta. Moderately stenosed origin of the celiac artery. No significant narrowing of the superior mesenteric artery.  Lymph nodes: Unremarkable. No enlarged lymph nodes.     Bladder: Numerous urinary bladder diverticula.   Reproductive: Unremarkable as visualized.   Bones/joints: Avascular necrosis of the left femoral head, without collapse.   Indeterminate low-attenuation lesion in the superior L 2 vertebral body,   potentially a Schmorl's node.   Soft tissues: Unremarkable.       IMPRESSION:    No evidence of ischemic bowel.    Dense aortic valve calcification suggestive of aortic valve stenosis.    Large bilateral pleural effusions.     Trace ascites.    1.6 cm posterior exophytic indeterminate left renal lesion. Recommend ultrasound to further evaluate. NEPHROLOGY INTERVAL HPI/OVERNIGHT EVENTS:  03-09-20 @ 10:19  events noted,  remains on dual pressors, dobutamine and levo   ct with iv contrast overnight for rising lactic acidosis: no ischemia colitis  large pleural effusions  ef now 20% with severe AS  2016 with ef of 60%     MEDICATIONS  (STANDING):  albumin human 25% IVPB 50 milliLiter(s) IV Intermittent <User Schedule>  albuterol/ipratropium for Nebulization 3 milliLiter(s) Nebulizer every 6 hours  azithromycin  IVPB 500 milliGRAM(s) IV Intermittent every 24 hours  cefTRIAXone Injectable. 1000 milliGRAM(s) IV Push every 24 hours  DOBUTamine Infusion 5 MICROgram(s)/kG/Min (5.1 mL/Hr) IV Continuous <Continuous>  midodrine 10 milliGRAM(s) Oral every 8 hours  norepinephrine Infusion 0.05 MICROgram(s)/kG/Min (3.188 mL/Hr) IV Continuous <Continuous>  pantoprazole  Injectable 40 milliGRAM(s) IV Push every 12 hours  phenylephrine    Infusion 0.5 MICROgram(s)/kG/Min (3.188 mL/Hr) IV Continuous <Continuous>  sevelamer carbonate 1600 milliGRAM(s) Oral two times a day with meals    MEDICATIONS  (PRN):  ondansetron Injectable 4 milliGRAM(s) IV Push once PRN Nausea and/or Vomiting      Allergies    Codeine Phosphate (Nausea)  morphine (Nausea)  prednisoLONE (Nausea)    Intolerances        I&O's Detail    08 Mar 2020 07:01  -  09 Mar 2020 07:00  --------------------------------------------------------  IN:    DOBUTamine Infusion: 32 mL    IV PiggyBack: 250 mL    Oral Fluid: 300 mL    phenylephrine   Infusion: 581 mL  Total IN: 1163 mL    OUT:  Total OUT: 0 mL    Total NET: 1163 mL      Vital Signs Last 24 Hrs  T(C): 36.4 (09 Mar 2020 08:00), Max: 36.4 (08 Mar 2020 14:00)  T(F): 97.5 (09 Mar 2020 08:00), Max: 97.6 (08 Mar 2020 14:00)  HR: 90 (09 Mar 2020 10:00) (82 - 106)  BP: 80/50 (09 Mar 2020 10:00) (65/43 - 117/60)  BP(mean): 58 (09 Mar 2020 10:00) (48 - 85)  RR: 15 (09 Mar 2020 10:00) (12 - 35)  SpO2: 97% (09 Mar 2020 10:00) (75% - 100%)  Daily     Daily     PHYSICAL EXAM:  General: alert. awake Ox3  HEENT: MMM  CV: s1s2 rrr  LUNGS: B/L CTA  EXT: no edema    LABS:                        10.9   10.74 )-----------( 45       ( 09 Mar 2020 06:28 )             33.6     03-09    142  |  104  |  51<H>  ----------------------------<  119<H>  4.2   |  27  |  5.20<H>    Ca    9.0      09 Mar 2020 06:28  Phos  5.1     03-09  Mg     2.3     03-08    TPro  5.1<L>  /  Alb  2.8<L>  /  TBili  1.7<H>  /  DBili  x   /  AST  133<H>  /  ALT  215<H>  /  AlkPhos  97  03-09    PT/INR - ( 09 Mar 2020 06:28 )   PT: 25.7 sec;   INR: 2.26 ratio         Phosphorus Level, Serum: 5.1 mg/dL (03-09 @ 06:28)    ABG - ( 09 Mar 2020 05:56 )  pH, Arterial: 7.47  pH, Blood: x     /  pCO2: 33    /  pO2: 140   / HCO3: 24    / Base Excess: .6    /  SaO2: 99        EXAM:  CT ABDOMEN AND PELVIS IC                            PROCEDURE DATE:  03/09/2020          INTERPRETATION:  PROCEDURE INFORMATION:   Exam: CT Abdomen And Pelvis With Contrast   Exam date and time: 3/9/2020 3:28 AM   Age: 74 years old   Clinical indication: Other: . ; Patient HX: On hd. Rising lactate. Hematemesis     TECHNIQUE:   Imaging protocol: Computed tomography of the abdomen and pelvis with   intravenous contrast.   Contrast material: OMNI 350; Contrast volume: 70 ml; Contrast route: IV;      COMPARISON:   No relevant prior studies available.     FINDINGS:   Lungs: Compressive atelectasis and the lower lobes bilaterally.   Pleural space: Large bilateral pleural effusions.   Heart: Cardiomegaly and aortic valvular calcifications.     Liver: Multiple hepatic cysts.   Gallbladder and bile ducts: Cholelithiasis. No cholecystitis or biliary ductal   dilatation.   Pancreas: Normal. No ductal dilation.   Spleen: Normal. No splenomegaly.   Adrenals: Normal. No mass.   Kidneys and ureters: Chronic medical renal disease. Nonobstructing stones in  the kidneys bilaterally. Scattered bilateral renal cysts. Indeterminate 1.6 cm left renal lesion. Heavily calcified 7 mm interpolar left renal lesion.    Stomach and bowel: Prior proctocolectomy and right lower quadrant ileostomy. No bowel obstruction, wall thickening, pneumatosis or inflammatory change.  Intraperitoneal space: Trace ascites.   Vasculature: IVC filter in place. Endograft in the visualized lower thoracic and suprarenal abdominal aorta. Moderately stenosed origin of the celiac artery. No significant narrowing of the superior mesenteric artery.  Lymph nodes: Unremarkable. No enlarged lymph nodes.     Bladder: Numerous urinary bladder diverticula.   Reproductive: Unremarkable as visualized.   Bones/joints: Avascular necrosis of the left femoral head, without collapse.   Indeterminate low-attenuation lesion in the superior L 2 vertebral body,   potentially a Schmorl's node.   Soft tissues: Unremarkable.       IMPRESSION:    No evidence of ischemic bowel.    Dense aortic valve calcification suggestive of aortic valve stenosis.    Large bilateral pleural effusions.     Trace ascites.    1.6 cm posterior exophytic indeterminate left renal lesion. Recommend ultrasound to further evaluate.

## 2020-03-09 NOTE — PROGRESS NOTE ADULT - ASSESSMENT
74 year old male admitted with coffee ground emeissi ( which he had at the facility but none here in this ER) thouhgt to have PNA , found to hav e increased LFT and INR. Her devleoped acute hypoxic respiratoy faioure which is likely secondaerry to heart fialure and volume overload. He was placed on BIPAP but systolic hypotension persisted and eventully requiried IV pressors for liekly decompensated heart failure in the setting of fluids and ESRD.     Hypotensive shock, possibly pneumonia.    Cardiomyopathy with low ejection fraction noted.    GI bleed with hematemesis which has not recurred.  Would continue Protonix.  Patient is at high risk of decompensation with endoscopic intervention at this time secondary to stability from GI perspective, would hold off at this time on endoscopy.    Acute respiratory failure with hypoxemia also noted.  He will be followed for this.

## 2020-03-09 NOTE — PROCEDURE NOTE - NSSITEPREP_SKIN_A_CORE
Adherence to aseptic technique: hand hygiene prior to donning barriers (gown, gloves), don cap and mask, sterile drape over patient/povidone iodine (if allergic to chlorhexidine)
chlorhexidine

## 2020-03-09 NOTE — DISCHARGE NOTE FOR THE EXPIRED PATIENT - NAME OF PERSON WHO MADE CONTACTED FAMILY
dheeraj santizo updated pts spouse silva at 2009 when she arrived to hospital and pt's [facility] via telephone at 2020 dheeraj santizo rn updated pts spouse silva at 2009 when she arrived to hospital and pt's [facility] at [phone] via telephone at 2020

## 2020-03-09 NOTE — PROGRESS NOTE ADULT - SUBJECTIVE AND OBJECTIVE BOX
Patient is a 74y old  Male who presents with a chief complaint of coffee ground emesis (09 Mar 2020 15:27)      HPI:  74 year old male who presented to ER from a local skilled nursing facility with coffee ground emesis He was admitted for GI Bleeding workup, found to have fever and questionable chest xray for pneumonia and  was diagnosed as pneumonia. Antibiotics were started ceftriaxone and azithromycin., When he developed hypoxia and hypotension rapid response was called and EICU evaluated the patient recommended no transfer to ICU starting the sepsis protocol and give fluid and reconsult if hypotension persists.     A second rapid was called a few hrs later which i responded to and found him slightly lethargic , on BIPAP and extremely hypotensive with systolic Bp in the 50's. He had increased work of breathing with accessory muscle use and audib He was placed on levophed which ended up causing severe tachycardia which was stopped , and changed to Oh-Synephrine with hopes to cause less tachy effects.  CXR and CT chest are suggestive of b/l pleural effusion and b/l pulmonary edema( ascultation of lungs shows bilateral crackles in all lung fields)  and by my opinion not impressive for PNA. I feel this is more cardiogenic shock with volume overload and bilateral pleura effusion.Note patient is ESRD on HD ( tue,thr,sat) unknown if he makes urine . Will be conservative with fluids and plan to contact nephrology for stat hemolysis. (07 Mar 2020 04:49)      Patient is pleasant but difficult to comprehend.  When he vocalizes, he can barely get any voice out and most of this is done by debriding.  He has a colostomy but does not know why.  He had an endoscopy at hospital years ago and was found to have peptic ulcer disease.  His colostomy output at this point is brown and green without any evidence of blood within it    neg abd pain    PAST MEDICAL & SURGICAL HISTORY:  Anemia  Hypertension  Renal failure  No significant past surgical history      MEDICATIONS  (STANDING):  albumin human 25% IVPB 50 milliLiter(s) IV Intermittent <User Schedule>  albuterol/ipratropium for Nebulization 3 milliLiter(s) Nebulizer every 6 hours  azithromycin  IVPB 500 milliGRAM(s) IV Intermittent every 24 hours  cefTRIAXone Injectable. 1000 milliGRAM(s) IV Push every 24 hours  DOBUTamine Infusion 5 MICROgram(s)/kG/Min (5.1 mL/Hr) IV Continuous <Continuous>  midodrine 10 milliGRAM(s) Oral every 8 hours  norepinephrine Infusion 0.05 MICROgram(s)/kG/Min (3.188 mL/Hr) IV Continuous <Continuous>  pantoprazole  Injectable 40 milliGRAM(s) IV Push every 12 hours  phenylephrine    Infusion 0.5 MICROgram(s)/kG/Min (3.188 mL/Hr) IV Continuous <Continuous>  sevelamer carbonate 1600 milliGRAM(s) Oral two times a day with meals    MEDICATIONS  (PRN):  ondansetron Injectable 4 milliGRAM(s) IV Push once PRN Nausea and/or Vomiting      Allergies    Codeine Phosphate (Nausea)  morphine (Nausea)  prednisoLONE (Nausea)    Intolerances        SOCIAL HISTORY:NC    FAMILY HISTORY:  No pertinent family history in first degree relatives      REVIEW OF SYSTEMS:  not reliable    Vital Signs Last 24 Hrs  T(C): 36.2 (09 Mar 2020 16:00), Max: 36.4 (09 Mar 2020 08:00)  T(F): 97.1 (09 Mar 2020 16:00), Max: 97.5 (09 Mar 2020 08:00)  HR: 95 (09 Mar 2020 17:39) (82 - 103)  BP: 81/53 (09 Mar 2020 17:39) (65/43 - 117/60)  BP(mean): 58 (09 Mar 2020 17:39) (48 - 85)  RR: 25 (09 Mar 2020 17:39) (12 - 35)  SpO2: 96% (09 Mar 2020 17:39) (81% - 100%)    PHYSICAL EXAM:    Constitutional: NAD, well-developed  HEENT: EOMI, throat clear  Neck: No LAD, supple  Respiratory: CTA and P  Cardiovascular: S1 and S2, RRR, no M  Gastrointestinal: BS+, soft, NT/ND, neg HSM, ostomy viable and neg blood  Extremities: No peripheral edema, neg clubing, cyanosis  Vascular: 2+ peripheral pulses  Neurological: A/O x 3, no focal deficits  Psychiatric: Normal mood, normal affect  Skin: No rashes    LABS:  CBC Full  -  ( 09 Mar 2020 06:28 )  WBC Count : 10.74 K/uL  RBC Count : 3.09 M/uL  Hemoglobin : 10.9 g/dL  Hematocrit : 33.6 %  Platelet Count - Automated : 45 K/uL  Mean Cell Volume : 108.7 fl  Mean Cell Hemoglobin : 35.3 pg  Mean Cell Hemoglobin Concentration : 32.4 gm/dL  Auto Neutrophil # : x  Auto Lymphocyte # : x  Auto Monocyte # : x  Auto Eosinophil # : x  Auto Basophil # : x  Auto Neutrophil % : x  Auto Lymphocyte % : x  Auto Monocyte % : x  Auto Eosinophil % : x  Auto Basophil % : x    03-09    142  |  104  |  51<H>  ----------------------------<  119<H>  4.2   |  27  |  5.20<H>    Ca    9.0      09 Mar 2020 06:28  Phos  5.1     03-09  Mg     2.3     03-08    TPro  5.1<L>  /  Alb  2.8<L>  /  TBili  1.7<H>  /  DBili  x   /  AST  133<H>  /  ALT  215<H>  /  AlkPhos  97  03-09    PT/INR - ( 09 Mar 2020 06:28 )   PT: 25.7 sec;   INR: 2.26 ratio             03-07 @ 09:15  Hep A Igm Nonreact  Hep A total ab, IgA and M --  Hep B core Ab total --  Hep B core IgM Nonreact  Hep B DNA PCR --  Hep BSAg --  Hep BSAb --  Hep BSAb --  HCV Ab --  HCV RNA Log --  HCV RNA interp --                RADIOLOGY & ADDITIONAL STUDIES:

## 2020-03-09 NOTE — PROGRESS NOTE ADULT - ASSESSMENT
73yo M admitted 3/6 with initial concern for GI bleeding but admission complicated by:  suspected sepsis due to pneumonia  cardiogenic shock with acute hypoxic respiratory failure due to fluid overload  shock requiring vasopressors  severe lactic metabolic acidosis  coagulopathy - on Eliquis - ?? INR elevated due to liver dz - treated with IV Vitamin K  thrombocytopenia  NSETMI  Cardiomyopathy with EF 20% and severe AS (0.6cm2).  ESRD on HD - s/p dialysis session 3/7  Large B/L pleural effusions on CT - likely result of Cardiomyopathy/decompensated HFrEF  passed swallow eval for puree/thin liquid 3/8      Plan:  ICU  HOB 30  GI and DVT   Continue Rocephin and Z-Max for PNA  empiric Rocephin and Zithromax for now  BC PND  cardiology eval/input appreciated  nephrology eval/HD appreciated--No HD Today because of BP  titrate pressors for MAP 65-70mmHg  Continue Dobutamine  Avoid Tachycardia because of  AS  Records from [place] [facility]  IR eval in am in regard to potential B/L thoracenteses --On Hold  RUQ sono to assess hepatic dz  GI input appreciated - no acute indication for endoscopy.  Monitor for s/s of bleeding.  evaluation for AICD if appropriate given his severe CM  Supportive care    D/W Cardio, Renal, and the patients wife

## 2020-03-09 NOTE — PROGRESS NOTE ADULT - SUBJECTIVE AND OBJECTIVE BOX
73 yo M admitted to hospital medicine service 3/6 due to coffee ground emesis - coincidently found to have fever and questionable chest x-ray for pneumonia and  was diagnosed as pneumonia. Antibiotics were started ceftriaxone and azithromycin., While waiting for evaluation in ED, pt developed hypoxia and hypotension at which time a rapid response was called and eICU evaluated the patient.  Multiple issues identified but no need for ICU evaluation at that time.    A second rapid was called a few hrs later while pt still waiting for eval in     [facility] evaluated the patient at that time - slightly lethargic , on BIPAP and extremely hypotensive with systolic Bp in the 50's (+) increased work of breathing with accessory muscle use - placed on levophed which induced tachycardia and was therefore changed to Oh-Synephrine.  CXR and CT chest are both thought to demonstrate B/L pleural effusions and B/L pulmonary edema - likely suffering from cardiogenic shock with volume overload.    Note patient is ESRD on HD ( tue,thr,sat) unknown if he makes urine      Pt transferred to ICU and plan made for HD in AM    3/7: seen and examined in AM and then again multiple times during HD and post-HD.  Initially pt on NIPPV - Sat 100% and pt uncomfortable and so FiO2 titrated down/pt tried OFF NIPPV - on f/u doing well with NC O2.  HD initiated and pt tolerated with some titration of vasopressors.  AA and interactive but poor historian.  Repeat Hgb reasonable.  No significant evidence of bleeding  Discussed with Dr Kebede and Dr Nelson.      Notably ECHO done - unofficial EF 20% with suspected severe AS valve area 0.6cm2.  D/w Dr. Garcia.    3/8:  pt is somewhat improved, but remains dependent on vasopressors to support survivable MAP - Midodrine dose increased.  Still requiring NIPPV at night.    CXR personally reviewed - persistent B/L effusions.  Seen by speech therapy - Ok for puree with thin liquids.  D/w Cardiology.    Notably lactate had been improving but this morning > 4.    Above D/W Dr. De La Cruz    3/9: Patient seen in bed on pressors and inotropic meds.  On Room air.              PAST MEDICAL & SURGICAL HISTORY:  Anemia  Hypertension  Renal failure  No significant past surgical history      FAMILY HISTORY:  No pertinent family history in first degree relatives      Social Hx:    Allergies    Codeine Phosphate (Nausea)  morphine (Nausea)  prednisoLONE (Nausea)    Intolerances            ICU Vital Signs Last 24 Hrs  T(C): 36.4 (09 Mar 2020 12:00), Max: 36.4 (09 Mar 2020 08:00)  T(F): 97.5 (09 Mar 2020 12:00), Max: 97.5 (09 Mar 2020 08:00)  HR: 97 (09 Mar 2020 14:45) (82 - 106)  BP: 91/62 (09 Mar 2020 14:45) (65/43 - 117/60)  BP(mean): 68 (09 Mar 2020 14:45) (48 - 85)  ABP: --  ABP(mean): --  RR: 15 (09 Mar 2020 14:45) (12 - 35)  SpO2: 95% (09 Mar 2020 14:45) (75% - 100%)          I&O's Summary    08 Mar 2020 07:01  -  09 Mar 2020 07:00  --------------------------------------------------------  IN: 1163 mL / OUT: 0 mL / NET: 1163 mL                              10.9   10.74 )-----------( 45       ( 09 Mar 2020 06:28 )             33.6       03-09    142  |  104  |  51<H>  ----------------------------<  119<H>  4.2   |  27  |  5.20<H>    Ca    9.0      09 Mar 2020 06:28  Phos  5.1     03-09  Mg     2.3     03-08    TPro  5.1<L>  /  Alb  2.8<L>  /  TBili  1.7<H>  /  DBili  x   /  AST  133<H>  /  ALT  215<H>  /  AlkPhos  97  03-09      CARDIAC MARKERS ( 09 Mar 2020 11:43 )  0.570 ng/mL / x     / x     / x     / x      CARDIAC MARKERS ( 09 Mar 2020 00:06 )  0.501 ng/mL / x     / 82 U/L / x     / x            ABG - ( 09 Mar 2020 05:56 )  pH, Arterial: 7.47  pH, Blood: x     /  pCO2: 33    /  pO2: 140   / HCO3: 24    / Base Excess: .6    /  SaO2: 99                      MEDICATIONS  (STANDING):  albumin human 25% IVPB 50 milliLiter(s) IV Intermittent <User Schedule>  albuterol/ipratropium for Nebulization 3 milliLiter(s) Nebulizer every 6 hours  azithromycin  IVPB 500 milliGRAM(s) IV Intermittent every 24 hours  cefTRIAXone Injectable. 1000 milliGRAM(s) IV Push every 24 hours  DOBUTamine Infusion 5 MICROgram(s)/kG/Min (5.1 mL/Hr) IV Continuous <Continuous>  midodrine 10 milliGRAM(s) Oral every 8 hours  norepinephrine Infusion 0.05 MICROgram(s)/kG/Min (3.188 mL/Hr) IV Continuous <Continuous>  pantoprazole  Injectable 40 milliGRAM(s) IV Push every 12 hours  phenylephrine    Infusion 0.5 MICROgram(s)/kG/Min (3.188 mL/Hr) IV Continuous <Continuous>  sevelamer carbonate 1600 milliGRAM(s) Oral two times a day with meals    MEDICATIONS  (PRN):  ondansetron Injectable 4 milliGRAM(s) IV Push once PRN Nausea and/or Vomiting      DVT Prophylaxis: V    Advanced Directives:  Discussed with:    Visit Information: 30 min    ** Time is exclusive of billed procedures and/or teaching and/or routine family updates.

## 2020-03-09 NOTE — PROCEDURE NOTE - ADDITIONAL PROCEDURE DETAILS
procedure performed independent of documneted critical care time
This central line was placed in the setting of septic vs cardiogenic shock with poor peripheral IV access and the need to start IV pressors for severe hypotension with maps into 40's.

## 2020-03-09 NOTE — CHART NOTE - NSCHARTNOTEFT_GEN_A_CORE
patients clinical condition warrants risk of emergent use of IV contrast to ensure no delay in potentially life threatening diagnosis. He is an ESRD on HD and scheduled for HD this morning.

## 2020-03-09 NOTE — CONSULT NOTE ADULT - SUBJECTIVE AND OBJECTIVE BOX
briefly:   74M HF, TTE now 20%, sepsis, PNA, cardiogenic shock, on levophed; liver enzymes improving.   Also persistent uneplxianed worsening lactemia 5.6à6.2à 9; h/o abd/pelvis CT – negative (but done without contrast) 3/6; vitals – has been on just phenylephrine, was hypotensive, no UO d/t CKD was started on dobutamine MAP 75;      [ ] I suggested checking vbg for pH, if normal may suggests D lactic acid (starvation ketosis), may improve with glucose; otherwise will need CT Abd/pelvis **WITH**  contrast to r/u ischemic bowel, already has CKD on HD   another consideration would be to get ABG and VBG, using Jeane's to confirm adequate circulation briefly:   74M HF, TTE now 20%, sepsis, PNA, cardiogenic shock, on levophed; liver enzymes improving.   Also persistent uneplxianed worsening lactemia 5.6à6.2à 9; h/o abd/pelvis CT – negative (but done without contrast) 3/6; vitals – has been on just phenylephrine, was hypotensive, no UO d/t CKD was started on dobutamine MAP 75;      [ ] I suggested checking vbg for pH, if normal may suggests D lactic acid (starvation ketosis), may improve with glucose; otherwise will need CT Abd/pelvis **WITH**  contrast to r/u ischemic bowel, already has CKD on HD   another consideration would be to get ABG and VBG, using Jeane's to confirm adequate circulation  d/w Derian MOSS

## 2020-03-13 DIAGNOSIS — A41.9 SEPSIS, UNSPECIFIED ORGANISM: ICD-10-CM

## 2020-03-13 DIAGNOSIS — J18.9 PNEUMONIA, UNSPECIFIED ORGANISM: ICD-10-CM

## 2020-03-13 DIAGNOSIS — D68.9 COAGULATION DEFECT, UNSPECIFIED: ICD-10-CM

## 2020-03-13 DIAGNOSIS — J96.01 ACUTE RESPIRATORY FAILURE WITH HYPOXIA: ICD-10-CM

## 2020-03-13 DIAGNOSIS — I25.10 ATHEROSCLEROTIC HEART DISEASE OF NATIVE CORONARY ARTERY WITHOUT ANGINA PECTORIS: ICD-10-CM

## 2020-03-13 DIAGNOSIS — I13.2 HYPERTENSIVE HEART AND CHRONIC KIDNEY DISEASE WITH HEART FAILURE AND WITH STAGE 5 CHRONIC KIDNEY DISEASE, OR END STAGE RENAL DISEASE: ICD-10-CM

## 2020-03-13 DIAGNOSIS — I35.0 NONRHEUMATIC AORTIC (VALVE) STENOSIS: ICD-10-CM

## 2020-03-13 DIAGNOSIS — N18.6 END STAGE RENAL DISEASE: ICD-10-CM

## 2020-03-13 DIAGNOSIS — I42.9 CARDIOMYOPATHY, UNSPECIFIED: ICD-10-CM

## 2020-03-13 DIAGNOSIS — I21.4 NON-ST ELEVATION (NSTEMI) MYOCARDIAL INFARCTION: ICD-10-CM

## 2020-03-13 DIAGNOSIS — D69.6 THROMBOCYTOPENIA, UNSPECIFIED: ICD-10-CM

## 2020-03-13 DIAGNOSIS — R57.0 CARDIOGENIC SHOCK: ICD-10-CM

## 2020-03-13 DIAGNOSIS — Z93.3 COLOSTOMY STATUS: ICD-10-CM

## 2020-03-13 DIAGNOSIS — D63.1 ANEMIA IN CHRONIC KIDNEY DISEASE: ICD-10-CM

## 2020-03-13 DIAGNOSIS — E87.2 ACIDOSIS: ICD-10-CM

## 2020-03-13 DIAGNOSIS — Z99.2 DEPENDENCE ON RENAL DIALYSIS: ICD-10-CM

## 2020-03-13 DIAGNOSIS — I50.23 ACUTE ON CHRONIC SYSTOLIC (CONGESTIVE) HEART FAILURE: ICD-10-CM

## 2020-03-14 LAB
CULTURE RESULTS: SIGNIFICANT CHANGE UP
CULTURE RESULTS: SIGNIFICANT CHANGE UP
SPECIMEN SOURCE: SIGNIFICANT CHANGE UP
SPECIMEN SOURCE: SIGNIFICANT CHANGE UP

## 2020-09-08 NOTE — ED PROVIDER NOTE - CARE PLAN
Patient's wife contacted the [facility] on this am.  The phone was on speaker and the pt could be heard in the back ground as well.  The wife stated the pt went to [facility] ED on 9/5/20 because while measuring his HR using a digital BP monitor he was getting readings in the 40's then the 80's and back to the 40's.  Discharge summary from his ED visit does state that the pt was evaluated by Cardiology (Dr. Vargas) and his pacemaker was checked. (report states device is working appropriately and he was cleared for discharge).  She then stated that again this am he was getting varying heart rates.  Both the pt and the wife were both trying to over talk each other.  Pt sent in a manual remote pacemaker transmission on this am.  Device RN reviewed the transmission and it reveals Lead measurements are all WNL, however, the pt has had 4,550 PVCs over the last 24 hours which could cause the pt's digital BP monitor to show varying rates.  Was able to get the pt and the wife to settle down to allow for me to express to them that his pacemaker is functioning appropriately and that the it is the increase % of PVCs that is making his HR appear to be below his programmed BR of 60 bpm on his pacemaker.  Pt stated other than waking up with a mild HA this am that was relieved with a Tylenol he feeling fine.  Pt was already scheduled to see the NP on 9/14/20 @ 8:00.  Informed the pt a pacemaker check would need to be done prior to that visit.  Pt was instructed to come into the clinic on 9/14/20 @ 7:40 am.  Pt and the wife were agreeable with this date and time and were instructed to contact the [facility] should he have any additional questions and/or concerns.  Understanding was verbalized.  Pt and the wife appreciated the time spent in assisting them.    Principal Discharge DX:	ESRD (end stage renal disease) on dialysis  Instructions for follow-up, activity and diet:	admit  Secondary Diagnosis:	Anemia  Secondary Diagnosis:	CAD (coronary artery disease)

## 2022-02-09 NOTE — PROGRESS NOTE ADULT - SUBJECTIVE AND OBJECTIVE BOX
74 year old male w/ h/o ESRD on HD, HTN, admitted for sepsis due to pneumonia, hospital course complicated by cardiogenic shock w/ hypoxic respiratory failure requiring vasopressors & dobutamine.  Echo this admit w/ severe systolic dysfunction w/ severe AS (0.6 cm2).  Currently on dobutamine, levophed & neosynephrine.    3/8/20: More awake. Remains on pressors tapering. Denies chest pain.   3/9/20: reports chest pain this AM.  ECG reviewed NSR w/ LVH & secondary ST-T changes.  review of prior records w/ nephrology SBP 130s at baseline.  CVP today 5-8.      MEDICATIONS:    MEDICATIONS  (STANDING):  albumin human 25% IVPB 50 milliLiter(s) IV Intermittent <User Schedule>  albuterol/ipratropium for Nebulization 3 milliLiter(s) Nebulizer every 6 hours  azithromycin  IVPB 500 milliGRAM(s) IV Intermittent every 24 hours  cefTRIAXone Injectable. 1000 milliGRAM(s) IV Push every 24 hours  DOBUTamine Infusion 5 MICROgram(s)/kG/Min (5.1 mL/Hr) IV Continuous <Continuous>  midodrine 10 milliGRAM(s) Oral every 8 hours  norepinephrine Infusion 0.05 MICROgram(s)/kG/Min (3.188 mL/Hr) IV Continuous <Continuous>  pantoprazole  Injectable 40 milliGRAM(s) IV Push every 12 hours  phenylephrine    Infusion 0.5 MICROgram(s)/kG/Min (3.188 mL/Hr) IV Continuous <Continuous>  sevelamer carbonate 1600 milliGRAM(s) Oral two times a day with meals    MEDICATIONS  (PRN):  ondansetron Injectable 4 milliGRAM(s) IV Push once PRN Nausea and/or Vomiting    Vital Signs Last 24 Hrs  T(C): 36.4 (09 Mar 2020 08:00), Max: 36.4 (08 Mar 2020 14:00)  T(F): 97.5 (09 Mar 2020 08:00), Max: 97.6 (08 Mar 2020 14:00)  HR: 86 (09 Mar 2020 10:51) (82 - 106)  BP: 93/57 (09 Mar 2020 10:51) (65/43 - 117/60)  BP(mean): 65 (09 Mar 2020 10:51) (48 - 85)  RR: 23 (09 Mar 2020 10:51) (12 - 35)  SpO2: 93% (09 Mar 2020 10:51) (75% - 100%)Daily     Daily I&O's Summary    08 Mar 2020 07:01  -  09 Mar 2020 07:00  --------------------------------------------------------  IN: 1163 mL / OUT: 0 mL / NET: 1163 mL        PHYSICAL EXAM:    Constitutional: NAD, cachectic  HEENT: PERR, EOMI,    Neck:  supple,  No JVD  Respiratory: Decreased breath sounds, poor inspiratory effor.t  Cardiovascular: S1S2, systolic murmur 2nd ICS RUSB rad. to carotids.  Extremities: No peripheral edema. No clubbing or cyanosis.  Vascular: 2+ peripheral pulses  Neurological: A/O x 3, no focal deficits    LABS: All Labs Reviewed:                        10.9   10.74 )-----------( 45       ( 09 Mar 2020 06:28 )             33.6                         11.3   14.86 )-----------( 60       ( 08 Mar 2020 06:43 )             35.2                         13.6   15.76 )-----------( 84       ( 07 Mar 2020 13:00 )             41.2     09 Mar 2020 06:28    142    |  104    |  51     ----------------------------<  119    4.2     |  27     |  5.20   08 Mar 2020 06:43    143    |  105    |  35     ----------------------------<  75     4.4     |  25     |  4.07   07 Mar 2020 06:42    143    |  102    |  56     ----------------------------<  97     5.6     |  26     |  5.67     Ca    9.0        09 Mar 2020 06:28  Ca    9.4        08 Mar 2020 06:43  Ca    9.5        07 Mar 2020 06:42  Phos  5.1       09 Mar 2020 06:28  Phos  4.4       08 Mar 2020 06:43  Mg     2.3       08 Mar 2020 06:43  Mg     2.5       07 Mar 2020 06:42    TPro  5.1    /  Alb  2.8    /  TBili  1.7    /  DBili  x      /  AST  133    /  ALT  215    /  AlkPhos  97     09 Mar 2020 06:28  TPro  5.7    /  Alb  3.0    /  TBili  2.3    /  DBili  x      /  AST  149    /  ALT  207    /  AlkPhos  93     08 Mar 2020 06:43  TPro  5.6    /  Alb  2.6    /  TBili  2.0    /  DBili  x      /  AST  211    /  ALT  251    /  AlkPhos  108    07 Mar 2020 06:42    PT/INR - ( 09 Mar 2020 06:28 )   PT: 25.7 sec;   INR: 2.26 ratio      CARDIAC MARKERS ( 09 Mar 2020 00:06 )  0.501 ng/mL / x     / 82 U/L / x     / x          Blood Culture:   03-06 @ 19:54  Pro Bnp [id number]    < from: TTE Echo Complete w/o contrast w/ Doppler (03.07.20 @ 12:23) >   Summary    Aortic Valve   Peak and mean transaortic gradients are   91 and 49 mmHg respectively; this finding is consistent with severe aortic   stenosis.   MAYUR is .55 cm2 by continuity equation.   At least moderate (2+) aortic regurgitation is present.   The dimensionless index is .12   The dimensionless index is .12         Fibrocalcific changes noted to the mitral valve leaflets with preserved   leaflet excursion.   Mild mitral annular calcification is present.   Mild (1+) mitral regurgitation is present.   EA reversal of the mitral inflow consistent with reduced compliance of the   left ventricle.   The tricuspid valve leaflets are thin and pliable; valve motion is normal.   Mild (1+) tricuspid valve regurgitation is present.   Pulmonic valve not well seen.   Trace pulmonic valvular regurgitation is present.   The left atrium is severely dilated by LA volume index.   The left ventricle cavity is mildly dilated.   The left ventricle size is mildly dilated, severe global (diffuse)   hypokinesis, and severely reduced function. The interventricular septum   appears akinetic.   Estimated left ventricular ejection fraction is 20 %.   The right atrium appears moderately dilated.   The right ventricle is mildly dilated.   The IVC is normal in size with decreased respiratory variation. The   patient was getting dialysis during the exam.   No evidence of pericardial effusion.   Pleural effusion - massive pleural effusion.     Signature     ----------------------------------------------------------------   Electronically signed by Imani CHAVEZInterpreting   physician) on 03/09/2020 09:22 AM   ----------------------------------------------------------------    < end of copied text > negative...

## 2023-10-05 NOTE — PATIENT PROFILE ADULT - NSPROEDALEARNPREF_GEN_A_NUR
Breath sounds clear and equal bilaterally.
group instruction/written material/skill demonstration/individual instruction